# Patient Record
Sex: FEMALE | Race: ASIAN | NOT HISPANIC OR LATINO | Employment: OTHER
[De-identification: names, ages, dates, MRNs, and addresses within clinical notes are randomized per-mention and may not be internally consistent; named-entity substitution may affect disease eponyms.]

---

## 2017-09-17 ENCOUNTER — HOSPITAL (OUTPATIENT)
Dept: OTHER | Age: 82
End: 2017-09-17
Attending: FAMILY MEDICINE

## 2017-09-17 LAB
AMORPH SED URNS QL MICRO: ABNORMAL
ANALYZER ANC (IANC): NORMAL
ANION GAP SERPL CALC-SCNC: 14 MMOL/L (ref 10–20)
APPEARANCE UR: CLEAR
BACTERIA #/AREA URNS HPF: ABNORMAL /HPF
BASOPHILS # BLD: 0 THOUSAND/MCL (ref 0–0.3)
BASOPHILS NFR BLD: 0 %
BILIRUB UR QL: NEGATIVE
BUN SERPL-MCNC: 16 MG/DL (ref 6–20)
BUN/CREAT SERPL: 22 (ref 7–25)
CALCIUM SERPL-MCNC: 9.2 MG/DL (ref 8.4–10.2)
CAOX CRY URNS QL MICRO: ABNORMAL
CHLORIDE: 96 MMOL/L (ref 98–107)
CO2 SERPL-SCNC: 29 MMOL/L (ref 21–32)
COLOR UR: YELLOW
CREAT SERPL-MCNC: 0.72 MG/DL (ref 0.51–0.95)
DIFFERENTIAL METHOD BLD: NORMAL
EOSINOPHIL # BLD: 0.3 THOUSAND/MCL (ref 0.1–0.5)
EOSINOPHIL NFR BLD: 4 %
EPITH CASTS #/AREA URNS LPF: ABNORMAL /[LPF]
ERYTHROCYTE [DISTWIDTH] IN BLOOD: 13.7 % (ref 11–15)
FATTY CASTS #/AREA URNS LPF: ABNORMAL /[LPF]
GLUCOSE BLDC GLUCOMTR-MCNC: 134 MG/DL (ref 65–99)
GLUCOSE BLDC GLUCOMTR-MCNC: 208 MG/DL (ref 65–99)
GLUCOSE SERPL-MCNC: 135 MG/DL (ref 65–99)
GLUCOSE UR-MCNC: NEGATIVE MG/DL
GLYCOHEMOGLOBIN: 6.6 % (ref 4.5–5.6)
GRAN CASTS #/AREA URNS LPF: ABNORMAL /[LPF]
HEMATOCRIT: 38.3 % (ref 36–46.5)
HGB BLD-MCNC: 12.5 GM/DL (ref 12–15.5)
HGB UR QL: NEGATIVE
HYALINE CASTS #/AREA URNS LPF: ABNORMAL /LPF (ref 0–5)
KETONES UR-MCNC: NEGATIVE MG/DL
LEUKOCYTE ESTERASE UR QL STRIP: ABNORMAL
LYMPHOCYTES # BLD: 2.2 THOUSAND/MCL (ref 1–4)
LYMPHOCYTES NFR BLD: 29 %
MCH RBC QN AUTO: 29.6 PG (ref 26–34)
MCHC RBC AUTO-ENTMCNC: 32.6 GM/DL (ref 32–36.5)
MCV RBC AUTO: 90.5 FL (ref 78–100)
MIXED CELL CASTS #/AREA URNS LPF: ABNORMAL /[LPF]
MONOCYTES # BLD: 0.5 THOUSAND/MCL (ref 0.3–0.9)
MONOCYTES NFR BLD: 7 %
MUCOUS THREADS URNS QL MICRO: ABNORMAL
NEUTROPHILS # BLD: 4.5 THOUSAND/MCL (ref 1.8–7.7)
NEUTROPHILS NFR BLD: 60 %
NEUTS SEG NFR BLD: NORMAL %
NITRITE UR QL: NEGATIVE
PERCENT NRBC: NORMAL
PH UR: 7 UNIT (ref 5–7)
PLATELET # BLD: 148 THOUSAND/MCL (ref 140–450)
POTASSIUM SERPL-SCNC: 4.1 MMOL/L (ref 3.4–5.1)
PROT UR QL: NEGATIVE MG/DL
RBC # BLD: 4.23 MILLION/MCL (ref 4–5.2)
RBC #/AREA URNS HPF: ABNORMAL /HPF (ref 0–3)
RBC CASTS #/AREA URNS LPF: ABNORMAL /[LPF]
RENAL EPI CELLS #/AREA URNS HPF: ABNORMAL /[HPF]
SODIUM SERPL-SCNC: 135 MMOL/L (ref 135–145)
SP GR UR: 1 (ref 1–1.03)
SPECIMEN SOURCE: ABNORMAL
SPERM URNS QL MICRO: ABNORMAL
SQUAMOUS #/AREA URNS HPF: ABNORMAL /HPF (ref 0–5)
T VAGINALIS URNS QL MICRO: ABNORMAL
TRI-PHOS CRY URNS QL MICRO: ABNORMAL
TROPONIN I SERPL HS-MCNC: <0.02 NG/ML
URATE CRY URNS QL MICRO: ABNORMAL
URINE REFLEX: ABNORMAL
URNS CMNT MICRO: ABNORMAL
UROBILINOGEN UR QL: 0.2 MG/DL (ref 0–1)
WAXY CASTS #/AREA URNS LPF: ABNORMAL /[LPF]
WBC # BLD: 7.4 THOUSAND/MCL (ref 4.2–11)
WBC #/AREA URNS HPF: ABNORMAL /HPF (ref 0–5)
WBC CASTS #/AREA URNS LPF: ABNORMAL /[LPF]
YEAST HYPHAE URNS QL MICRO: ABNORMAL
YEAST URNS QL MICRO: ABNORMAL

## 2017-09-18 ENCOUNTER — DIAGNOSTIC TRANS (OUTPATIENT)
Dept: OTHER | Age: 82
End: 2017-09-18

## 2017-09-18 ENCOUNTER — CHARTING TRANS (OUTPATIENT)
Dept: OTHER | Age: 82
End: 2017-09-18

## 2017-09-18 LAB
GLUCOSE BLDC GLUCOMTR-MCNC: 110 MG/DL (ref 65–99)
GLUCOSE BLDC GLUCOMTR-MCNC: 113 MG/DL (ref 65–99)
GLUCOSE BLDC GLUCOMTR-MCNC: 138 MG/DL (ref 65–99)

## 2017-09-19 ENCOUNTER — DIAGNOSTIC TRANS (OUTPATIENT)
Dept: OTHER | Age: 82
End: 2017-09-19

## 2017-09-19 LAB
CHOLEST SERPL-MCNC: 131 MG/DL
CHOLEST/HDLC SERPL: 2.6 {RATIO}
GLUCOSE BLDC GLUCOMTR-MCNC: 105 MG/DL (ref 65–99)
GLUCOSE BLDC GLUCOMTR-MCNC: 116 MG/DL (ref 65–99)
GLUCOSE BLDC GLUCOMTR-MCNC: 159 MG/DL (ref 65–99)
GLUCOSE BLDC GLUCOMTR-MCNC: 179 MG/DL (ref 65–99)
HDLC SERPL-MCNC: 51 MG/DL
LDLC SERPL CALC-MCNC: 53 MG/DL
MAGNESIUM SERPL-MCNC: 1.3 MG/DL (ref 1.7–2.4)
NONHDLC SERPL-MCNC: 80 MG/DL
T4 SERPL-MCNC: 9.8 MCG/DL (ref 4.7–13.3)
TRIGLYCERIDE (TRIGP): 137 MG/DL
TSH SERPL-ACNC: 3.1 MCUNIT/ML (ref 0.35–5)

## 2017-10-23 ENCOUNTER — CHARTING TRANS (OUTPATIENT)
Dept: OTHER | Age: 82
End: 2017-10-23

## 2020-10-08 ENCOUNTER — HOSPITAL ENCOUNTER (EMERGENCY)
Age: 85
Discharge: HOME | End: 2020-10-08
Attending: EMERGENCY MEDICINE

## 2020-10-08 ENCOUNTER — APPOINTMENT (OUTPATIENT)
Dept: GENERAL RADIOLOGY | Age: 85
End: 2020-10-08
Attending: STUDENT IN AN ORGANIZED HEALTH CARE EDUCATION/TRAINING PROGRAM

## 2020-10-08 VITALS
DIASTOLIC BLOOD PRESSURE: 75 MMHG | OXYGEN SATURATION: 97 % | TEMPERATURE: 97.5 F | WEIGHT: 124.56 LBS | RESPIRATION RATE: 16 BRPM | SYSTOLIC BLOOD PRESSURE: 143 MMHG | HEART RATE: 70 BPM

## 2020-10-08 DIAGNOSIS — R55 SYNCOPE AND COLLAPSE: Primary | ICD-10-CM

## 2020-10-08 DIAGNOSIS — N30.00 ACUTE CYSTITIS WITHOUT HEMATURIA: ICD-10-CM

## 2020-10-08 LAB
ALBUMIN SERPL-MCNC: 3.4 G/DL (ref 3.6–5.1)
ALBUMIN/GLOB SERPL: 0.9 {RATIO} (ref 1–2.4)
ALP SERPL-CCNC: 51 UNITS/L (ref 45–117)
ALT SERPL-CCNC: 21 UNITS/L
ANION GAP SERPL CALC-SCNC: 6 MMOL/L (ref 10–20)
APPEARANCE UR: ABNORMAL
AST SERPL-CCNC: 18 UNITS/L
ATRIAL RATE (BPM): 62
BACTERIA #/AREA URNS HPF: ABNORMAL /HPF
BASOPHILS # BLD: 0 K/MCL (ref 0–0.3)
BASOPHILS NFR BLD: 0 %
BILIRUB SERPL-MCNC: 0.3 MG/DL (ref 0.2–1)
BILIRUB UR QL STRIP: NEGATIVE
BUN SERPL-MCNC: 21 MG/DL (ref 6–20)
BUN/CREAT SERPL: 34 (ref 7–25)
CALCIUM SERPL-MCNC: 9.1 MG/DL (ref 8.4–10.2)
CHLORIDE SERPL-SCNC: 92 MMOL/L (ref 98–107)
CO2 SERPL-SCNC: 33 MMOL/L (ref 21–32)
COLOR UR: YELLOW
CREAT SERPL-MCNC: 0.61 MG/DL (ref 0.51–0.95)
DIFFERENTIAL METHOD BLD: ABNORMAL
EOSINOPHIL # BLD: 0.2 K/MCL (ref 0.1–0.5)
EOSINOPHIL NFR BLD: 5 %
ERYTHROCYTE [DISTWIDTH] IN BLOOD: 16.2 % (ref 11–15)
GLOBULIN SER-MCNC: 3.9 G/DL (ref 2–4)
GLUCOSE SERPL-MCNC: 127 MG/DL (ref 65–99)
GLUCOSE UR STRIP-MCNC: NEGATIVE MG/DL
HCT VFR BLD CALC: 34.3 % (ref 36–46.5)
HGB BLD-MCNC: 11.2 G/DL (ref 12–15.5)
HGB UR QL STRIP: NEGATIVE
HYALINE CASTS #/AREA URNS LPF: ABNORMAL /LPF (ref 0–5)
IMM GRANULOCYTES # BLD AUTO: 0 K/MCL (ref 0–0.2)
IMM GRANULOCYTES NFR BLD: 1 %
KETONES UR STRIP-MCNC: NEGATIVE MG/DL
LEUKOCYTE ESTERASE UR QL STRIP: ABNORMAL
LYMPHOCYTES # BLD: 1.4 K/MCL (ref 1–4)
LYMPHOCYTES NFR BLD: 29 %
MAGNESIUM SERPL-MCNC: 1.7 MG/DL (ref 1.7–2.4)
MCH RBC QN AUTO: 30 PG (ref 26–34)
MCHC RBC AUTO-ENTMCNC: 32.7 G/DL (ref 32–36.5)
MCV RBC AUTO: 92 FL (ref 78–100)
MONOCYTES # BLD: 0.5 K/MCL (ref 0.3–0.9)
MONOCYTES NFR BLD: 10 %
MUCOUS THREADS URNS QL MICRO: PRESENT
NEUTROPHILS # BLD: 2.8 K/MCL (ref 1.8–7.7)
NEUTROPHILS NFR BLD: 55 %
NITRITE UR QL STRIP: NEGATIVE
NRBC BLD MANUAL-RTO: 0 /100 WBC
P AXIS (DEGREES): 37
PH UR STRIP: 7 UNITS (ref 5–7)
PLATELET # BLD: 207 K/MCL (ref 140–450)
POTASSIUM SERPL-SCNC: 4.3 MMOL/L (ref 3.4–5.1)
PR-INTERVAL (MSEC): 140
PROT SERPL-MCNC: 7.3 G/DL (ref 6.4–8.2)
PROT UR STRIP-MCNC: 30 MG/DL
QRS-INTERVAL (MSEC): 104
QT-INTERVAL (MSEC): 416
QTC: 422
R AXIS (DEGREES): 5
RBC # BLD: 3.73 MIL/MCL (ref 4–5.2)
RBC #/AREA URNS HPF: ABNORMAL /HPF (ref 0–2)
REPORT TEXT: NORMAL
SODIUM SERPL-SCNC: 127 MMOL/L (ref 135–145)
SP GR UR STRIP: 1.01 (ref 1–1.03)
SPECIMEN SOURCE: ABNORMAL
SQUAMOUS #/AREA URNS HPF: ABNORMAL /HPF (ref 0–5)
T AXIS (DEGREES): 56
TROPONIN I SERPL HS-MCNC: <0.02 NG/ML
TROPONIN I SERPL HS-MCNC: <0.02 NG/ML
UROBILINOGEN UR STRIP-MCNC: 0.2 MG/DL (ref 0–1)
VENTRICULAR RATE EKG/MIN (BPM): 62
WBC # BLD: 5 K/MCL (ref 4.2–11)
WBC #/AREA URNS HPF: >100 /HPF (ref 0–5)

## 2020-10-08 PROCEDURE — 84484 ASSAY OF TROPONIN QUANT: CPT

## 2020-10-08 PROCEDURE — 10002807 HB RX 258: Performed by: EMERGENCY MEDICINE

## 2020-10-08 PROCEDURE — 71045 X-RAY EXAM CHEST 1 VIEW: CPT

## 2020-10-08 PROCEDURE — 10002800 HB RX 250 W HCPCS: Performed by: EMERGENCY MEDICINE

## 2020-10-08 PROCEDURE — 87086 URINE CULTURE/COLONY COUNT: CPT

## 2020-10-08 PROCEDURE — 81001 URINALYSIS AUTO W/SCOPE: CPT

## 2020-10-08 PROCEDURE — 87186 SC STD MICRODIL/AGAR DIL: CPT

## 2020-10-08 PROCEDURE — 85025 COMPLETE CBC W/AUTO DIFF WBC: CPT

## 2020-10-08 PROCEDURE — 96374 THER/PROPH/DIAG INJ IV PUSH: CPT

## 2020-10-08 PROCEDURE — 10002803 HB RX 637: Performed by: EMERGENCY MEDICINE

## 2020-10-08 PROCEDURE — 10004651 HB RX, NO CHARGE ITEM: Performed by: EMERGENCY MEDICINE

## 2020-10-08 PROCEDURE — 99285 EMERGENCY DEPT VISIT HI MDM: CPT

## 2020-10-08 PROCEDURE — 80053 COMPREHEN METABOLIC PANEL: CPT

## 2020-10-08 PROCEDURE — 83735 ASSAY OF MAGNESIUM: CPT

## 2020-10-08 PROCEDURE — 93005 ELECTROCARDIOGRAM TRACING: CPT | Performed by: STUDENT IN AN ORGANIZED HEALTH CARE EDUCATION/TRAINING PROGRAM

## 2020-10-08 PROCEDURE — 87077 CULTURE AEROBIC IDENTIFY: CPT

## 2020-10-08 RX ORDER — LISINOPRIL 10 MG/1
10 TABLET ORAL
COMMUNITY
End: 2021-09-01 | Stop reason: ALTCHOICE

## 2020-10-08 RX ORDER — LOSARTAN POTASSIUM 50 MG/1
50 TABLET ORAL 2 TIMES DAILY
COMMUNITY
End: 2021-12-01 | Stop reason: ALTCHOICE

## 2020-10-08 RX ORDER — LANOLIN ALCOHOL/MO/W.PET/CERES
400 CREAM (GRAM) TOPICAL ONCE
Status: COMPLETED | OUTPATIENT
Start: 2020-10-08 | End: 2020-10-08

## 2020-10-08 RX ORDER — ACETAMINOPHEN 325 MG/1
650 TABLET ORAL ONCE
Status: COMPLETED | OUTPATIENT
Start: 2020-10-08 | End: 2020-10-08

## 2020-10-08 RX ORDER — VERAPAMIL HYDROCHLORIDE 240 MG/1
TABLET, FILM COATED, EXTENDED RELEASE ORAL
COMMUNITY
End: 2021-09-01 | Stop reason: ALTCHOICE

## 2020-10-08 RX ORDER — FOSINOPRIL SODIUM 40 MG/1
TABLET ORAL
COMMUNITY
End: 2021-09-01 | Stop reason: ALTCHOICE

## 2020-10-08 RX ORDER — CEFAZOLIN SODIUM/WATER 2 G/20 ML
2000 SYRINGE (ML) INTRAVENOUS ONCE
Status: COMPLETED | OUTPATIENT
Start: 2020-10-08 | End: 2020-10-08

## 2020-10-08 RX ORDER — ACETAMINOPHEN 325 MG/1
TABLET ORAL
Status: DISCONTINUED
Start: 2020-10-08 | End: 2020-10-08 | Stop reason: HOSPADM

## 2020-10-08 RX ORDER — CARVEDILOL 25 MG/1
25 TABLET ORAL 2 TIMES DAILY WITH MEALS
Status: ON HOLD | COMMUNITY
End: 2021-12-07 | Stop reason: HOSPADM

## 2020-10-08 RX ORDER — ALENDRONATE SODIUM 70 MG/1
TABLET ORAL
COMMUNITY
End: 2021-09-01 | Stop reason: ALTCHOICE

## 2020-10-08 RX ORDER — OXYBUTYNIN CHLORIDE 5 MG/1
TABLET ORAL
COMMUNITY
Start: 2020-07-23 | End: 2021-09-01 | Stop reason: ALTCHOICE

## 2020-10-08 RX ORDER — SIMVASTATIN 20 MG
TABLET ORAL
COMMUNITY
Start: 2020-07-22 | End: 2021-09-01 | Stop reason: ALTCHOICE

## 2020-10-08 RX ORDER — ISOSORBIDE MONONITRATE 30 MG/1
60 TABLET, EXTENDED RELEASE ORAL DAILY
COMMUNITY
End: 2021-09-30 | Stop reason: DRUGHIGH

## 2020-10-08 RX ORDER — CEPHALEXIN 500 MG/1
500 CAPSULE ORAL 2 TIMES DAILY
Qty: 14 CAPSULE | Refills: 0 | Status: SHIPPED | OUTPATIENT
Start: 2020-10-08 | End: 2020-10-15

## 2020-10-08 RX ORDER — FUROSEMIDE 20 MG/1
20 TABLET ORAL DAILY
COMMUNITY
End: 2021-09-01 | Stop reason: ALTCHOICE

## 2020-10-08 RX ORDER — HYDROCHLOROTHIAZIDE 25 MG/1
TABLET ORAL
COMMUNITY
End: 2021-09-01 | Stop reason: ALTCHOICE

## 2020-10-08 RX ORDER — GLIPIZIDE 10 MG/1
TABLET, FILM COATED, EXTENDED RELEASE ORAL
COMMUNITY
End: 2021-09-01 | Stop reason: ALTCHOICE

## 2020-10-08 RX ADMIN — Medication 400 MG: at 16:40

## 2020-10-08 RX ADMIN — CEFTRIAXONE SODIUM 2000 MG: 100 INJECTION, POWDER, FOR SOLUTION INTRAVENOUS at 18:50

## 2020-10-08 RX ADMIN — SODIUM CHLORIDE 500 ML: 0.9 INJECTION, SOLUTION INTRAVENOUS at 16:41

## 2020-10-08 RX ADMIN — ACETAMINOPHEN 650 MG: 325 TABLET, FILM COATED ORAL at 18:58

## 2020-10-08 ASSESSMENT — ENCOUNTER SYMPTOMS
HEMATOLOGIC/LYMPHATIC NEGATIVE: 1
FATIGUE: 1
GASTROINTESTINAL NEGATIVE: 1
NEUROLOGICAL NEGATIVE: 1
RESPIRATORY NEGATIVE: 1
ENDOCRINE NEGATIVE: 1
PSYCHIATRIC NEGATIVE: 1

## 2020-10-10 LAB
BACTERIA UR CULT: ABNORMAL
ORGANISM: ABNORMAL
REPORT STATUS (RPT): ABNORMAL
SPECIMEN SOURCE: ABNORMAL

## 2021-07-25 ENCOUNTER — APPOINTMENT (OUTPATIENT)
Dept: CT IMAGING | Facility: HOSPITAL | Age: 86
DRG: 445 | End: 2021-07-25
Attending: EMERGENCY MEDICINE
Payer: MEDICARE

## 2021-07-25 ENCOUNTER — APPOINTMENT (OUTPATIENT)
Dept: GENERAL RADIOLOGY | Facility: HOSPITAL | Age: 86
DRG: 445 | End: 2021-07-25
Payer: MEDICARE

## 2021-07-25 ENCOUNTER — HOSPITAL ENCOUNTER (INPATIENT)
Facility: HOSPITAL | Age: 86
LOS: 9 days | Discharge: NURSING, REHAB OR LONG-TERM CARE | DRG: 445 | End: 2021-08-04
Attending: EMERGENCY MEDICINE | Admitting: STUDENT IN AN ORGANIZED HEALTH CARE EDUCATION/TRAINING PROGRAM
Payer: MEDICARE

## 2021-07-25 DIAGNOSIS — E87.1 HYPONATREMIA: ICD-10-CM

## 2021-07-25 DIAGNOSIS — D64.9 ANEMIA, UNSPECIFIED TYPE: ICD-10-CM

## 2021-07-25 DIAGNOSIS — R17 PAINLESS JAUNDICE: Primary | ICD-10-CM

## 2021-07-25 DIAGNOSIS — R55 SYNCOPE, NEAR: ICD-10-CM

## 2021-07-25 DIAGNOSIS — R79.89 ELEVATED LFTS: ICD-10-CM

## 2021-07-25 DIAGNOSIS — R79.89 AZOTEMIA: ICD-10-CM

## 2021-07-25 DIAGNOSIS — R11.2 NAUSEA AND VOMITING IN ADULT: ICD-10-CM

## 2021-07-25 LAB
ALBUMIN SERPL-MCNC: 2.4 G/DL (ref 3.4–5)
ALBUMIN/GLOB SERPL: 0.5 {RATIO} (ref 1–2)
ALP LIVER SERPL-CCNC: 1450 U/L
ALT SERPL-CCNC: 145 U/L
ANION GAP SERPL CALC-SCNC: 6 MMOL/L (ref 0–18)
AST SERPL-CCNC: 287 U/L (ref 15–37)
BASOPHILS # BLD AUTO: 0.02 X10(3) UL (ref 0–0.2)
BASOPHILS NFR BLD AUTO: 0.3 %
BILIRUB SERPL-MCNC: 8.9 MG/DL (ref 0.1–2)
BILIRUB UR QL STRIP.AUTO: NEGATIVE
BUN BLD-MCNC: 12 MG/DL (ref 7–18)
BUN/CREAT SERPL: 26.7 (ref 10–20)
CALCIUM BLD-MCNC: 8.7 MG/DL (ref 8.5–10.1)
CHLORIDE SERPL-SCNC: 85 MMOL/L (ref 98–112)
CLARITY UR REFRACT.AUTO: CLEAR
CO2 SERPL-SCNC: 31 MMOL/L (ref 21–32)
COLOR UR AUTO: YELLOW
CREAT BLD-MCNC: 0.45 MG/DL
DEPRECATED RDW RBC AUTO: 55.3 FL (ref 35.1–46.3)
EOSINOPHIL # BLD AUTO: 0.22 X10(3) UL (ref 0–0.7)
EOSINOPHIL NFR BLD AUTO: 3.2 %
ERYTHROCYTE [DISTWIDTH] IN BLOOD BY AUTOMATED COUNT: 18.1 % (ref 11–15)
GLOBULIN PLAS-MCNC: 4.9 G/DL (ref 2.8–4.4)
GLUCOSE BLD-MCNC: 159 MG/DL (ref 70–99)
GLUCOSE UR STRIP.AUTO-MCNC: NEGATIVE MG/DL
HCT VFR BLD AUTO: 32.3 %
HGB BLD-MCNC: 11.1 G/DL
IMM GRANULOCYTES # BLD AUTO: 0.05 X10(3) UL (ref 0–1)
IMM GRANULOCYTES NFR BLD: 0.7 %
KETONES UR STRIP.AUTO-MCNC: NEGATIVE MG/DL
LIPASE SERPL-CCNC: 223 U/L (ref 73–393)
LYMPHOCYTES # BLD AUTO: 1.08 X10(3) UL (ref 1–4)
LYMPHOCYTES NFR BLD AUTO: 15.5 %
M PROTEIN MFR SERPL ELPH: 7.3 G/DL (ref 6.4–8.2)
MCH RBC QN AUTO: 29.6 PG (ref 26–34)
MCHC RBC AUTO-ENTMCNC: 34.4 G/DL (ref 31–37)
MCV RBC AUTO: 86.1 FL
MONOCYTES # BLD AUTO: 0.44 X10(3) UL (ref 0.1–1)
MONOCYTES NFR BLD AUTO: 6.3 %
NEUTROPHILS # BLD AUTO: 5.14 X10 (3) UL (ref 1.5–7.7)
NEUTROPHILS # BLD AUTO: 5.14 X10(3) UL (ref 1.5–7.7)
NEUTROPHILS NFR BLD AUTO: 74 %
NITRITE UR QL STRIP.AUTO: NEGATIVE
OSMOLALITY SERPL CALC.SUM OF ELEC: 257 MOSM/KG (ref 275–295)
PH UR STRIP.AUTO: 8 [PH] (ref 5–8)
PLATELET # BLD AUTO: 251 10(3)UL (ref 150–450)
PLATELET MORPHOLOGY: NORMAL
POTASSIUM SERPL-SCNC: 3.9 MMOL/L (ref 3.5–5.1)
PROT UR STRIP.AUTO-MCNC: NEGATIVE MG/DL
RBC # BLD AUTO: 3.75 X10(6)UL
RBC UR QL AUTO: NEGATIVE
SARS-COV-2 RNA RESP QL NAA+PROBE: NOT DETECTED
SODIUM SERPL-SCNC: 122 MMOL/L (ref 136–145)
SP GR UR STRIP.AUTO: 1.01 (ref 1–1.03)
TROPONIN I SERPL-MCNC: <0.045 NG/ML (ref ?–0.04)
UROBILINOGEN UR STRIP.AUTO-MCNC: <2 MG/DL
WBC # BLD AUTO: 7 X10(3) UL (ref 4–11)

## 2021-07-25 PROCEDURE — 99223 1ST HOSP IP/OBS HIGH 75: CPT | Performed by: STUDENT IN AN ORGANIZED HEALTH CARE EDUCATION/TRAINING PROGRAM

## 2021-07-25 PROCEDURE — 74177 CT ABD & PELVIS W/CONTRAST: CPT | Performed by: EMERGENCY MEDICINE

## 2021-07-25 PROCEDURE — 71045 X-RAY EXAM CHEST 1 VIEW: CPT

## 2021-07-25 PROCEDURE — 71045 X-RAY EXAM CHEST 1 VIEW: CPT | Performed by: EMERGENCY MEDICINE

## 2021-07-25 RX ORDER — LOSARTAN POTASSIUM 50 MG/1
50 TABLET ORAL 2 TIMES DAILY
COMMUNITY

## 2021-07-25 RX ORDER — ONDANSETRON 2 MG/ML
4 INJECTION INTRAMUSCULAR; INTRAVENOUS ONCE
Status: COMPLETED | OUTPATIENT
Start: 2021-07-25 | End: 2021-07-25

## 2021-07-25 RX ORDER — SODIUM CHLORIDE 9 MG/ML
INJECTION, SOLUTION INTRAVENOUS CONTINUOUS
Status: DISCONTINUED | OUTPATIENT
Start: 2021-07-25 | End: 2021-07-29

## 2021-07-26 ENCOUNTER — APPOINTMENT (OUTPATIENT)
Dept: MRI IMAGING | Facility: HOSPITAL | Age: 86
DRG: 445 | End: 2021-07-26
Attending: NURSE PRACTITIONER
Payer: MEDICARE

## 2021-07-26 ENCOUNTER — APPOINTMENT (OUTPATIENT)
Dept: ULTRASOUND IMAGING | Facility: HOSPITAL | Age: 86
DRG: 445 | End: 2021-07-26
Attending: STUDENT IN AN ORGANIZED HEALTH CARE EDUCATION/TRAINING PROGRAM
Payer: MEDICARE

## 2021-07-26 PROBLEM — D64.9 ANEMIA, UNSPECIFIED TYPE: Status: ACTIVE | Noted: 2021-07-26

## 2021-07-26 PROBLEM — R79.89 AZOTEMIA: Status: ACTIVE | Noted: 2021-07-26

## 2021-07-26 PROBLEM — R55 SYNCOPE, NEAR: Status: ACTIVE | Noted: 2021-07-26

## 2021-07-26 PROBLEM — R11.2 NAUSEA AND VOMITING IN ADULT: Status: ACTIVE | Noted: 2021-07-26

## 2021-07-26 PROBLEM — E87.1 HYPONATREMIA: Status: ACTIVE | Noted: 2021-07-26

## 2021-07-26 LAB
ALBUMIN SERPL-MCNC: 2.1 G/DL (ref 3.4–5)
ALBUMIN/GLOB SERPL: 0.5 {RATIO} (ref 1–2)
ALP LIVER SERPL-CCNC: 1256 U/L
ALT SERPL-CCNC: 110 U/L
ANION GAP SERPL CALC-SCNC: 5 MMOL/L (ref 0–18)
APAP SERPL-MCNC: <2 UG/ML (ref 10–30)
AST SERPL-CCNC: 198 U/L (ref 15–37)
ATRIAL RATE: 78 BPM
ATRIAL RATE: 80 BPM
BASOPHILS # BLD AUTO: 0.02 X10(3) UL (ref 0–0.2)
BASOPHILS NFR BLD AUTO: 0.3 %
BILIRUB DIRECT SERPL-MCNC: 5 MG/DL (ref 0–0.2)
BILIRUB SERPL-MCNC: 5.9 MG/DL (ref 0.1–2)
BUN BLD-MCNC: 13 MG/DL (ref 7–18)
BUN/CREAT SERPL: 32.5 (ref 10–20)
CALCIUM BLD-MCNC: 8.3 MG/DL (ref 8.5–10.1)
CHLORIDE SERPL-SCNC: 91 MMOL/L (ref 98–112)
CO2 SERPL-SCNC: 29 MMOL/L (ref 21–32)
CREAT BLD-MCNC: 0.4 MG/DL
DEPRECATED HBV CORE AB SER IA-ACNC: 294.1 NG/ML
DEPRECATED RDW RBC AUTO: 58.4 FL (ref 35.1–46.3)
EOSINOPHIL # BLD AUTO: 0.03 X10(3) UL (ref 0–0.7)
EOSINOPHIL NFR BLD AUTO: 0.5 %
ERYTHROCYTE [DISTWIDTH] IN BLOOD BY AUTOMATED COUNT: 18.3 % (ref 11–15)
EST. AVERAGE GLUCOSE BLD GHB EST-MCNC: 97 MG/DL (ref 68–126)
GLOBULIN PLAS-MCNC: 4 G/DL (ref 2.8–4.4)
GLUCOSE BLD-MCNC: 105 MG/DL (ref 70–99)
GLUCOSE BLD-MCNC: 108 MG/DL (ref 70–99)
GLUCOSE BLD-MCNC: 111 MG/DL (ref 70–99)
GLUCOSE BLD-MCNC: 122 MG/DL (ref 70–99)
GLUCOSE BLD-MCNC: 136 MG/DL (ref 70–99)
HAV AB SER QL IA: REACTIVE
HAV IGM SER QL: NONREACTIVE
HBA1C MFR BLD HPLC: 5 % (ref ?–5.7)
HBV SURFACE AB SER QL: NONREACTIVE
HBV SURFACE AB SERPL IA-ACNC: <3.1 MIU/ML
HBV SURFACE AG SER-ACNC: <0.1 [IU]/L
HBV SURFACE AG SERPL QL IA: NONREACTIVE
HCT VFR BLD AUTO: 31.5 %
HCV AB SERPL QL IA: NONREACTIVE
HGB BLD-MCNC: 10.7 G/DL
IMM GRANULOCYTES # BLD AUTO: 0.05 X10(3) UL (ref 0–1)
IMM GRANULOCYTES NFR BLD: 0.8 %
IRON SATURATION: 31 %
IRON SERPL-MCNC: 72 UG/DL
LYMPHOCYTES # BLD AUTO: 0.86 X10(3) UL (ref 1–4)
LYMPHOCYTES NFR BLD AUTO: 13.8 %
M PROTEIN MFR SERPL ELPH: 6.1 G/DL (ref 6.4–8.2)
MCH RBC QN AUTO: 30 PG (ref 26–34)
MCHC RBC AUTO-ENTMCNC: 34 G/DL (ref 31–37)
MCV RBC AUTO: 88.2 FL
MONOCYTES # BLD AUTO: 0.47 X10(3) UL (ref 0.1–1)
MONOCYTES NFR BLD AUTO: 7.5 %
NEUTROPHILS # BLD AUTO: 4.82 X10 (3) UL (ref 1.5–7.7)
NEUTROPHILS # BLD AUTO: 4.82 X10(3) UL (ref 1.5–7.7)
NEUTROPHILS NFR BLD AUTO: 77.1 %
NT-PROBNP SERPL-MCNC: 1745 PG/ML (ref ?–450)
OSMOLALITY SERPL CALC.SUM OF ELEC: 261 MOSM/KG (ref 275–295)
P AXIS: 20 DEGREES
P AXIS: 36 DEGREES
P-R INTERVAL: 146 MS
P-R INTERVAL: 160 MS
PLATELET # BLD AUTO: 235 10(3)UL (ref 150–450)
POTASSIUM SERPL-SCNC: 3.8 MMOL/L (ref 3.5–5.1)
Q-T INTERVAL: 422 MS
Q-T INTERVAL: 426 MS
QRS DURATION: 110 MS
QRS DURATION: 94 MS
QTC CALCULATION (BEZET): 485 MS
QTC CALCULATION (BEZET): 486 MS
R AXIS: -11 DEGREES
R AXIS: 91 DEGREES
RBC # BLD AUTO: 3.57 X10(6)UL
SODIUM SERPL-SCNC: 125 MMOL/L (ref 136–145)
T AXIS: 28 DEGREES
T AXIS: 53 DEGREES
TOTAL IRON BINDING CAPACITY: 232 UG/DL (ref 240–450)
TRANSFERRIN SERPL-MCNC: 156 MG/DL (ref 200–360)
TRANSFERRIN SERPL-MCNC: 156 MG/DL (ref 200–360)
VENTRICULAR RATE: 78 BPM
VENTRICULAR RATE: 80 BPM
VIT B12 SERPL-MCNC: 674 PG/ML (ref 193–986)
WBC # BLD AUTO: 6.3 X10(3) UL (ref 4–11)

## 2021-07-26 PROCEDURE — 76700 US EXAM ABDOM COMPLETE: CPT | Performed by: STUDENT IN AN ORGANIZED HEALTH CARE EDUCATION/TRAINING PROGRAM

## 2021-07-26 PROCEDURE — 99223 1ST HOSP IP/OBS HIGH 75: CPT | Performed by: SURGERY

## 2021-07-26 PROCEDURE — 74181 MRI ABDOMEN W/O CONTRAST: CPT | Performed by: NURSE PRACTITIONER

## 2021-07-26 PROCEDURE — 76376 3D RENDER W/INTRP POSTPROCES: CPT | Performed by: NURSE PRACTITIONER

## 2021-07-26 PROCEDURE — 99232 SBSQ HOSP IP/OBS MODERATE 35: CPT | Performed by: HOSPITALIST

## 2021-07-26 RX ORDER — LOSARTAN POTASSIUM 50 MG/1
50 TABLET ORAL DAILY
Status: DISCONTINUED | OUTPATIENT
Start: 2021-07-26 | End: 2021-07-26

## 2021-07-26 RX ORDER — POLYETHYLENE GLYCOL 3350 17 G/17G
17 POWDER, FOR SOLUTION ORAL DAILY PRN
Status: DISCONTINUED | OUTPATIENT
Start: 2021-07-26 | End: 2021-08-04

## 2021-07-26 RX ORDER — ALPRAZOLAM 0.25 MG/1
0.25 TABLET ORAL 2 TIMES DAILY
Status: ON HOLD | COMMUNITY
Start: 2021-05-01 | End: 2021-08-04

## 2021-07-26 RX ORDER — SODIUM PHOSPHATE, DIBASIC AND SODIUM PHOSPHATE, MONOBASIC 7; 19 G/133ML; G/133ML
1 ENEMA RECTAL ONCE
Status: COMPLETED | OUTPATIENT
Start: 2021-07-26 | End: 2021-07-26

## 2021-07-26 RX ORDER — ENOXAPARIN SODIUM 100 MG/ML
40 INJECTION SUBCUTANEOUS DAILY
Status: DISCONTINUED | OUTPATIENT
Start: 2021-07-26 | End: 2021-08-04

## 2021-07-26 RX ORDER — ONDANSETRON 2 MG/ML
4 INJECTION INTRAMUSCULAR; INTRAVENOUS EVERY 6 HOURS PRN
Status: DISCONTINUED | OUTPATIENT
Start: 2021-07-26 | End: 2021-08-04

## 2021-07-26 RX ORDER — CARVEDILOL 12.5 MG/1
25 TABLET ORAL DAILY
Status: DISCONTINUED | OUTPATIENT
Start: 2021-07-26 | End: 2021-07-26

## 2021-07-26 RX ORDER — BISACODYL 10 MG
10 SUPPOSITORY, RECTAL RECTAL
Status: DISCONTINUED | OUTPATIENT
Start: 2021-07-26 | End: 2021-08-04

## 2021-07-26 RX ORDER — METOCLOPRAMIDE HYDROCHLORIDE 5 MG/ML
10 INJECTION INTRAMUSCULAR; INTRAVENOUS EVERY 8 HOURS PRN
Status: DISCONTINUED | OUTPATIENT
Start: 2021-07-26 | End: 2021-08-04

## 2021-07-26 RX ORDER — SODIUM CHLORIDE 9 MG/ML
INJECTION, SOLUTION INTRAVENOUS CONTINUOUS
Status: DISCONTINUED | OUTPATIENT
Start: 2021-07-26 | End: 2021-07-29

## 2021-07-26 RX ORDER — LISINOPRIL 40 MG/1
40 TABLET ORAL DAILY
Status: DISCONTINUED | OUTPATIENT
Start: 2021-07-26 | End: 2021-07-26

## 2021-07-26 RX ORDER — ISOSORBIDE MONONITRATE 60 MG/1
60 TABLET, EXTENDED RELEASE ORAL DAILY
COMMUNITY
Start: 2021-06-15

## 2021-07-26 RX ORDER — ONDANSETRON 2 MG/ML
4 INJECTION INTRAMUSCULAR; INTRAVENOUS EVERY 4 HOURS PRN
Status: DISCONTINUED | OUTPATIENT
Start: 2021-07-26 | End: 2021-07-26

## 2021-07-26 RX ORDER — POLYETHYLENE GLYCOL 3350 17 G/17G
17 POWDER, FOR SOLUTION ORAL EVERY OTHER DAY
Status: ON HOLD | COMMUNITY
Start: 2021-04-10 | End: 2021-07-31

## 2021-07-26 RX ORDER — TRAMADOL HYDROCHLORIDE 50 MG/1
50 TABLET ORAL EVERY 6 HOURS PRN
Status: DISCONTINUED | OUTPATIENT
Start: 2021-07-26 | End: 2021-08-04

## 2021-07-26 RX ORDER — LOSARTAN POTASSIUM 50 MG/1
50 TABLET ORAL 2 TIMES DAILY
Status: DISCONTINUED | OUTPATIENT
Start: 2021-07-26 | End: 2021-08-04

## 2021-07-26 RX ORDER — ISOSORBIDE MONONITRATE 60 MG/1
60 TABLET, EXTENDED RELEASE ORAL DAILY
Status: DISCONTINUED | OUTPATIENT
Start: 2021-07-27 | End: 2021-08-02

## 2021-07-26 RX ORDER — SODIUM PHOSPHATE, DIBASIC AND SODIUM PHOSPHATE, MONOBASIC 7; 19 G/133ML; G/133ML
1 ENEMA RECTAL ONCE AS NEEDED
Status: DISCONTINUED | OUTPATIENT
Start: 2021-07-26 | End: 2021-08-04

## 2021-07-26 RX ORDER — POLYETHYLENE GLYCOL 3350 17 G/17G
17 POWDER, FOR SOLUTION ORAL DAILY
Status: DISCONTINUED | OUTPATIENT
Start: 2021-07-26 | End: 2021-08-04

## 2021-07-26 RX ORDER — CARVEDILOL 12.5 MG/1
25 TABLET ORAL 2 TIMES DAILY WITH MEALS
Status: DISCONTINUED | OUTPATIENT
Start: 2021-07-26 | End: 2021-08-04

## 2021-07-26 RX ORDER — FUROSEMIDE 20 MG/1
20 TABLET ORAL DAILY
Status: ON HOLD | COMMUNITY
Start: 2021-06-27 | End: 2021-07-29

## 2021-07-26 RX ORDER — DEXTROSE MONOHYDRATE 25 G/50ML
50 INJECTION, SOLUTION INTRAVENOUS
Status: DISCONTINUED | OUTPATIENT
Start: 2021-07-26 | End: 2021-08-04

## 2021-07-26 RX ORDER — ATORVASTATIN CALCIUM 10 MG/1
10 TABLET, FILM COATED ORAL NIGHTLY
Status: DISCONTINUED | OUTPATIENT
Start: 2021-07-26 | End: 2021-08-04

## 2021-07-26 RX ORDER — NITROFURANTOIN MACROCRYSTALS 50 MG/1
50 CAPSULE ORAL NIGHTLY
Status: ON HOLD | COMMUNITY
Start: 2021-06-29 | End: 2021-08-04

## 2021-07-26 RX ORDER — SODIUM CHLORIDE 9 MG/ML
INJECTION, SOLUTION INTRAVENOUS CONTINUOUS
Status: ACTIVE | OUTPATIENT
Start: 2021-07-26 | End: 2021-07-26

## 2021-07-26 NOTE — OCCUPATIONAL THERAPY NOTE
OCCUPATIONAL THERAPY EVALUATION - INPATIENT     Room Number: 4422/7608-F  Evaluation Date: 7/26/2021  Type of Evaluation: Initial  Presenting Problem: jaundice    Physician Order: IP Consult to Occupational Therapy  Reason for Therapy: ADL/IADL Dysfunction COMPLEX  3/2/2011    Performed by Elease Angelucci at [facility]   • [address]  1/19/2011    [facility] [place] [address] SITUATION  Type of Home: House  Neo intact    Communication:speaks Laurita, able to communicate with her son    Behavioral/Emotional/Social: [facility]    RANGE OF MOTION AND STRENGTH ASSESSMENT  Upper extremity ROM is within functional limits     Upper extremity strength is within functional limits take the patient home with increased CG support. Nurse was updated on recommendation and patient mobiltiy status. Patient End of Session: Up in chair;Needs met;Call light within reach;RN aware of session/findings; All patient questions and concerns addre Recommendations: Mayela funes    PLAN  OT Treatment Plan: Energy conservation/work simplification techniques;ADL training;Functional transfer training; Endurance training;Patient/Family education;Patient/Family training;Equipment eval/education; Compensatory te

## 2021-07-26 NOTE — PLAN OF CARE
Patient alert and oriented x4. On Ra, . NSR on tele. Patient complains of bilateral knee pain. Heat packs applied. PRN tramadol. Purewick. Pt back in bed. Resting comfortably in bed. Will continue to monitor.       1300:  Plan for MRCP, MRI scre

## 2021-07-26 NOTE — PROGRESS NOTES
07/26/21 1231   Clinical Encounter Type   Visited With Patient and family together   Routine Visit Introduction   Continue Visiting Yes   Nondenominational Encounters   Nondenominational Needs Prayer    visited with patient and son.   provided pastoral c

## 2021-07-26 NOTE — ED PROVIDER NOTES
Patient Seen in: [facility] Emergency Department      History   Patient presents with:  Syncope  Nausea/Vomiting/Diarrhea  Abnormal Labs  Jaundice    Stated Complaint: syncope after n/v    HPI/Subjective:   HPI    59-year-old female [facility] [address] 2103]   BP (!) 189/75   Pulse 88   Resp 19   Temp 99.5 °F (37.5 °C)   Temp src Oral   SpO2 98 %   O2 Device None (Room air)       Current:BP (!) 189/75   Pulse 78   Temp 99.5 °F (37.5 °C) (Oral)   Resp 16   Ht 152.4 cm (5')   Wt 52.2 kg   SpO2 98%   BMI 22 ---------                               -----------         ------                     CBC W/ DIFFERENTIAL[[id number]]          Abnormal            Final result                 Please view results for these tests on the individual orders. Disposition and Plan     Clinical Impression:  Painless jaundice  (primary encounter diagnosis)  Hyponatremia  Anemia, unspecified type  Nausea and vomiting in adult  Azotemia  Syncope, near     Disposition:  There is no disposition on file for [facility]

## 2021-07-26 NOTE — DIETARY NOTE
[facility]    NUTRITION ASSESSMENT    Pt  malnutrition criteria.     CRITERIA FOR MALNUTRITION DIAGNOSIS:  Criteria for non-severe malnutrition diagnosis: chronic illness related to wt loss 10% in 6 months, energy intake less than75% for greater than 1 emesis    NUTRITION RELATED PHYSICAL FINDINGS:     1. Body Fat/Muscle Mass: mild depletion body fat triceps region and mild depletion muscle mass clavicle region      2.  Fluid Accumulation: none     NUTRITION PRESCRIPTION:   Calories: 8973-7825 calories/da

## 2021-07-26 NOTE — ED INITIAL ASSESSMENT (HPI)
Family called ems for weakness/syncope after eating tonight  Hx of syncope   pmd called son with abnormal lab values today.  Jaundice+  Ems gave [id number] iv pta

## 2021-07-26 NOTE — PHYSICAL THERAPY NOTE
PHYSICAL THERAPY EVALUATION - INPATIENT     Room Number: 5209/6515-E  Evaluation Date: 7/26/2021  Type of Evaluation: Initial  Physician Order: PT Eval and Treat    Presenting Problem: Painless jaundice, hyponatremia, near syncope and anemia  Reason History  Past Medical History:   Diagnosis Date   • Essential hypertension    • Type II or unspecified type diabetes mellitus without mention of complication, not stated as uncontrolled 2010       Past Surgical History  Past Surgical History:   Procedure L extremity ROM and strength are-see OT eval    Lower extremity ROM is within functional limits    Lower extremity strength is : LLE hip flex 3+/5, quad 3+/5,ankle 4-/5; RLE hip flex 4-/5, quad 4-/5, ankle 4-/5    BALANCE  Static Sitting: Fair -  Dynamic Sit able to amb 6 steps using RW c max A from EOB to adjacent BSC c the above deviations. Pt sat c mod A. BLE were elev, chair alarm activated and RN/PCT made aware of session.      Exercise/Education Provided:  Bed mobility  Body mechanics  Energy conservation Fair  Frequency (Obs): 3-5x/week  Number of Visits to Meet Established Goals: 5      CURRENT GOALS    Goal #1 Patient is able to demonstrate supine - sit EOB @ level: minimum assistance     Goal #2 Patient is able to demonstrate transfers Sit to/from Stand

## 2021-07-26 NOTE — PLAN OF CARE
Patient alert and oriented x4 on room air ,[address] ,family presented, admitted with syncope and elevated liver enzymes ,  Cxray and Ct abdomen done , US of Abdomen in am ,npo maintained ,will call Gi consult ,denies any cp/ chest discomfort , sinus on monitor, effectiveness of vasoactive medications to optimize hemodynamic stability  - Monitor arterial and/or venous puncture sites for bleeding and/or hematoma  - Assess quality of pulses, skin color and temperature  - Assess for signs of decreased coronary artery review patient's medication profile  Outcome: Progressing  Goal: Maintains adequate nutritional intake (undernourished)  Description: INTERVENTIONS:  - Monitor percentage of each meal consumed  - Identify factors contributing to decreased intake, treat as Administer electrolyte replacement as ordered  - Monitor response to electrolyte replacements, including rhythm and repeat lab results as appropriate  - Fluid restriction as ordered  - Instruct patient on fluid and nutrition restrictions as appropriate  Ou

## 2021-07-26 NOTE — H&P
[facility] HOSPITALIST  History and Physical     Medina Pinky Patient Status:  Emergency    1933 MRN [id number]   Location [facility] Attending Celia Stacy MD   Hosp Day # 0 PCP No primary care provider on file EC 81 MG OR TBEC, 1 TABLET DAILY, Disp: , Rfl:   SIMVASTATIN 20 MG OR TABS, 1 TABLET EVERY EVENING, Disp: , Rfl:   OYSTER-DINESH 500 OR, 1 TABLET TWICE DAILY, Disp: , Rfl:   GLIPIZIDE 10 MG OR TB24, 1 TABLET TWICE DAILY (Patient not taking:  ), Disp: , Rfl: Recent Labs   Lab 07/25/21 2109   *   BUN 12   CREATSERUM 0.45*   GFRAA 104   GFRNAA 90   CA 8.7   ALB 2.4*   *   K 3.9   CL 85*   CO2 31.0   ALKPHO 1,450*   *   *   BILT 8.9*   TP 7.3       Estimated Creatinine Clearance:

## 2021-07-26 NOTE — CM/SW NOTE
MSW met with pt's son, we discuss Donamae Mike vs MAXIMINO. Pt's son declines MAXIMINO. Son is calling senior services to see if he can get more than 11 hours a week to care for his mom. He works from home 4 days a week.  I discussed that getting 40 hours a week of care was n

## 2021-07-26 NOTE — PROGRESS NOTES
[facility] HOSPITALIST  Progress Note     Oleta Flattery Patient Status:  Inpatient    1933 MRN [id number]   [facility] 3NE-A Attending Angi Messina MD   Hosp Day # 0 PCP PHYSICIAN NONSTAFF     Chief Complaint: jaundice    S: Leann Posadas --  1,215*       Creatinine Kinase  No results for input(s): CK in the last 168 hours. Inflammatory Markers  No results for input(s): CRP, GENOVEVA, LDH, DDIMER in the last 168 hours. Imaging: Imaging data reviewed in Epic.     Medications:   • losartan P

## 2021-07-26 NOTE — CONSULTS
[facility]                       Gastroenterology [address] [facility] Gastroenterology    Oleta Flattery Patient Status:  Inpatient    1933 MRN [id number]   [facility] 3NE-A Attending Angi Messina MD   Hosp Day # 0 [facility] mild wall thickening (? Stercoral colitis); labs LFTs improved (  Alk Phos 1256 Bili 5.9), normal lipase (223), normal kidney function, pBNP >1700, albumin 2.1, and stable anemia Hgb 10.7 (MCV 88.2) from 11.1 on arrival--no baseline labs avai mg, 10 mg, Rectal, Daily PRN  Fleet Enema (FLEET) 7-19 GM/118ML enema 133 mL, 1 enema, Rectal, Once PRN  [COMPLETED] sodium chloride 0.9% IV bolus 500 mL, 500 mL, Intravenous, Once   Followed by  0.9% NaCl infusion, , Intravenous, Continuous  [COMPLETED] o able to speak in complete sentences per son providing translation   HENT: EOMI, PERRL, oropharynx is clear with moist mucosal membranes  Eyes: Sclerae are icteric  Neck:  Supple without nuchal rigidity  CV: Regular rate and rhythm, with normal S1 and S2  R gallbladder, common bile duct, pancreas, spleen, kidneys, IVC, and aorta.       PATIENT STATED HISTORY: (As transcribed by Technologist)              FINDINGS:     LIVER:  Normal size and echogenicity. No significant masses.    BILIARY:  Gallbladder poorly Registry.       PATIENT STATED HISTORY:(As transcribed by Technologist) Hussein Charter is here with weakness, jaundice and recent syncope        CONTRAST USED:  65cc of Omnipaque 350       FINDINGS:     LIVER: Lincoln Brochure is a nodular contour to the liver suggesting URINARY BLADDER:  No visible focal wall thickening, lesion, or calculus.     PELVIC NODES:  No adenopathy.     PELVIC ORGANS:  No visible mass.  Pelvic organs appropriate for patient age.     BONES:  No bony lesion or fracture.     LUNG BASES:  Moderate septation with contents appearing echogenic and CBD 4 mm; CT a/p IV suggests a nodular contour of liver with borderline hepatomegaly and mild degree of intrahepatic biliary tree dilation (? Mirizzi's syndrome) and moderate stool burden throughout the colon surg consult  MRCP to better define biliary tree  Role for ERCP/EUS determined,  pending above MR results  Surg onc consult placed  IVF, analgesia, anti-emetics per primary     Che Moran MD  7/26/2021  [facility] Gastroenterology

## 2021-07-26 NOTE — H&P (VIEW-ONLY)
[facility]                       Gastroenterology [address] [facility] Gastroenterology    [facility]:  [facility]    1933 MRN [id number]   [facility] 3NE-A Attending Javi Lugo MD   Hosp Day # 0 [facility] mild wall thickening (? Stercoral colitis); labs LFTs improved (  Alk Phos 1256 Bili 5.9), normal lipase (223), normal kidney function, pBNP >1700, albumin 2.1, and stable anemia Hgb 10.7 (MCV 88.2) from 11.1 on arrival--no baseline labs avai mg, 10 mg, Rectal, Daily PRN  Fleet Enema (FLEET) 7-19 GM/118ML enema 133 mL, 1 enema, Rectal, Once PRN  [COMPLETED] sodium chloride 0.9% IV bolus 500 mL, 500 mL, Intravenous, Once   Followed by  0.9% NaCl infusion, , Intravenous, Continuous  [COMPLETED] o able to speak in complete sentences per son providing translation   HENT: EOMI, PERRL, oropharynx is clear with moist mucosal membranes  Eyes: Sclerae are icteric  Neck:  Supple without nuchal rigidity  CV: Regular rate and rhythm, with normal S1 and S2  R gallbladder, common bile duct, pancreas, spleen, kidneys, IVC, and aorta.       PATIENT STATED HISTORY: (As transcribed by Technologist)              FINDINGS:     LIVER:  Normal size and echogenicity. No significant masses.    BILIARY:  Gallbladder poorly Registry.       PATIENT STATED HISTORY:(As transcribed by Technologist) Ele Dhaliwal is here with weakness, jaundice and recent syncope        CONTRAST USED:  65cc of Omnipaque 350       FINDINGS:     LIVER: Tilmon John is a nodular contour to the liver suggesting URINARY BLADDER:  No visible focal wall thickening, lesion, or calculus.     PELVIC NODES:  No adenopathy.     PELVIC ORGANS:  No visible mass.  Pelvic organs appropriate for patient age.     BONES:  No bony lesion or fracture.     LUNG BASES:  Moderate septation with contents appearing echogenic and CBD 4 mm; CT a/p IV suggests a nodular contour of liver with borderline hepatomegaly and mild degree of intrahepatic biliary tree dilation (? Mirizzi's syndrome) and moderate stool burden throughout the colon surg consult  MRCP to better define biliary tree  Role for ERCP/EUS determined,  pending above MR results  Surg onc consult placed  IVF, analgesia, anti-emetics per primary     Elsa Enrique MD  7/26/2021  [facility] Gastroenterology

## 2021-07-26 NOTE — CONSULTS
[facility]  Report of Consultation    Dean Champion Patient Status:  Inpatient    1933 MRN [id number]   [facility] 3NE-A Attending Javi Lugo MD   Hosp Day # 0 PCP PHYSICIAN NONSTAFF     Reason for Consultation:  aMteo Lange daughter. The patient has never had a colonoscopy and upper endoscopy.        History:  Past Medical History:   Diagnosis Date   • Essential hypertension    • Type II or unspecified type diabetes mellitus without mention of complication, not stated as un abnormal sleep patterns, increased activity, polydipsia and polyphagia  ENMT:  Negative for ear drainage, hearing loss and nasal drainage  Eyes:  Negative for eye discharge and vision loss  Gastrointestinal: Positive for vomiting, chronic constipation and 110 07/26/2021     07/26/2021    BILT 5.9 07/26/2021    ALB 2.1 07/26/2021    TP 6.1 07/26/2021     CT of the abdomen and pelvis  (Dr. Stef Noyola reviewed the report.)  FINDINGS:     LIVER: Hillery Lisandra is a nodular contour to the liver suggesting possible ci  No visible focal wall thickening, lesion, or calculus.     PELVIC NODES:  No adenopathy.     PELVIC ORGANS:  No visible mass.  Pelvic organs appropriate for patient age.     BONES:  No bony lesion or fracture.     LUNG BASES:  Moderate pulmonary fibrosis bile duct measures 4 mm, normal caliber. PANCREAS:  Normal.   SPLEEN:  Normal.   KIDNEYS:  Upper pole cyst right kidney 2.2 cm. Atrophy left kidney.  Right kidney measures 12.6 cm.  Left kidney measures 5.9 cm.    AORTA/IVC:  Normal.                      after having multiple bouts of emesis where she passed out in her bathroom. Her son called EMS. Of note, her son provides the history. The patient's primary noticed the patient was jaundiced and had recently ordered blood work.   Her son reports she was

## 2021-07-26 NOTE — PLAN OF CARE
Pt transferred to room 2717. Pt family aware. Belongings sent. Report given to [facility].

## 2021-07-27 ENCOUNTER — ANESTHESIA EVENT (OUTPATIENT)
Dept: ENDOSCOPY | Facility: HOSPITAL | Age: 86
DRG: 445 | End: 2021-07-27
Payer: MEDICARE

## 2021-07-27 LAB
ALBUMIN SERPL-MCNC: 2.2 G/DL (ref 3.4–5)
ALBUMIN/GLOB SERPL: 0.6 {RATIO} (ref 1–2)
ALP LIVER SERPL-CCNC: 1304 U/L
ALT SERPL-CCNC: 102 U/L
ANION GAP SERPL CALC-SCNC: 7 MMOL/L (ref 0–18)
AST SERPL-CCNC: 178 U/L (ref 15–37)
BILIRUB SERPL-MCNC: 6.1 MG/DL (ref 0.1–2)
BUN BLD-MCNC: 17 MG/DL (ref 7–18)
BUN/CREAT SERPL: 32.7 (ref 10–20)
CALCIUM BLD-MCNC: 8.3 MG/DL (ref 8.5–10.1)
CANCER AG19-9 SERPL-ACNC: 18.8 U/ML (ref ?–37)
CEA SERPL-MCNC: 6.1 NG/ML (ref ?–5)
CHLORIDE SERPL-SCNC: 96 MMOL/L (ref 98–112)
CO2 SERPL-SCNC: 26 MMOL/L (ref 21–32)
CREAT BLD-MCNC: 0.52 MG/DL
GLOBULIN PLAS-MCNC: 3.9 G/DL (ref 2.8–4.4)
GLUCOSE BLD-MCNC: 106 MG/DL (ref 70–99)
GLUCOSE BLD-MCNC: 108 MG/DL (ref 70–99)
GLUCOSE BLD-MCNC: 122 MG/DL (ref 70–99)
GLUCOSE BLD-MCNC: 136 MG/DL (ref 70–99)
GLUCOSE BLD-MCNC: 93 MG/DL (ref 70–99)
HAV IGM SER QL: 1.6 MG/DL (ref 1.6–2.6)
INR BLD: 1.04 (ref 0.89–1.11)
M PROTEIN MFR SERPL ELPH: 6.1 G/DL (ref 6.4–8.2)
OSMOLALITY SERPL CALC.SUM OF ELEC: 269 MOSM/KG (ref 275–295)
POTASSIUM SERPL-SCNC: 3.9 MMOL/L (ref 3.5–5.1)
PSA SERPL DL<=0.01 NG/ML-MCNC: 13.9 SECONDS (ref 12.4–14.6)
SODIUM SERPL-SCNC: 129 MMOL/L (ref 136–145)

## 2021-07-27 PROCEDURE — 99232 SBSQ HOSP IP/OBS MODERATE 35: CPT | Performed by: HOSPITALIST

## 2021-07-27 PROCEDURE — 99222 1ST HOSP IP/OBS MODERATE 55: CPT | Performed by: SURGERY

## 2021-07-27 PROCEDURE — 99232 SBSQ HOSP IP/OBS MODERATE 35: CPT | Performed by: SURGERY

## 2021-07-27 NOTE — PAYOR COMM NOTE
--------------  ADMISSION REVIEW     Payor: [address] #:  [id number]  Authorization Number: [id number]    Admit date: 7/26/21  Admit time: 12:31 AM       Admitting Physician: Corky Mei MD  Attending Physician:  Ag Dave, HPI.  Constitutional and vital signs reviewed. All other systems reviewed and negative except as noted above.     Physical Exam     ED Triage Vitals [07/25/21 2103]   BP (!) 189/75   Pulse 88   Resp 19   Temp 99.5 °F (37.5 °C)   Temp src Oral   SpO2 98 Narrative: The following orders were created for panel order CBC With Differential With Platelet.   Procedure                               Abnormality         Status                     ---------                               -----------         ------ syncope.       Disposition and Plan     Clinical Impression:  Painless jaundice  (primary encounter diagnosis)  Hyponatremia  Anemia, unspecified type  Nausea and vomiting in adult  Azotemia  Syncope, near     Disposition:  There is no disposition on file f icterus. Neck: No lymphadenopathy. No JVD. No carotid bruits. Respiratory: Clear to auscultation bilaterally. No wheezes. No rhonchi. Cardiovascular: S1, S2. Regular rate and rhythm. No murmurs, rubs or gallops. Equal pulses.    Chest and Back: No tender and UTI who presented to the [facility] after a syncopal episode following multiple episodes of vomiting. The patient is accompanied by her his son who serves as her .   The patient has lost 20 pounds unintentionally over the last 6 months and has [facility] with painless jaundice, pruritus, and a 20 pound unintentional weight loss over the last 6 months. CT and ultrasound with findings potential for neoplasm.      Plan:  1. Surgical oncology consulted for possible gallbladder mass  2.  Patient to und signs:   Temp:  [97.5 °F (36.4 °C)-99.5 °F (37.5 °C)] 97.8 °F (36.6 °C)  Pulse:  [57-88] 73  Resp:  [15-20] 18  BP: (147-189)/() 147/87     Wt Readings from Last 6 Encounters:  07/25/21 : 115 lb (52.2 kg)  03/02/11 : 170 lb (77.1 kg)  01/19/11 : 176 l Oral Nightly   • Insulin Aspart Pen  1-5 Units Subcutaneous TID CC and HS   • enoxaparin  40 mg Subcutaneous Daily         ASSESSMENT / PLAN:      4. Painless jaundice  5. HTN  6. DMII-monitor on insulin  7. Hyponatremia-fluid restrict; urine osm  8.  Ida Been Problem List:     Painless jaundice     Essential hypertension     Type 2 diabetes mellitus without complication (HCC)     Hyponatremia     Anemia, unspecified type     Nausea and vomiting in adult     Azotemia     Syncope, near        Painless jaundice  E concurrent supervision.  Images were archived.       PATIENT STATED HISTORY:(As transcribed by Technologist)  Patient offered no additional history at this time.            FINDINGS:     LIVER:  No enlargement, atrophy, abnormal density, or significant foca     Dictated by (CST): Austen Tubbs DO on 7/26/2021 at 11:21 PM              MEDICATIONS ADMINISTERED IN LAST 1 DAY:  atorvastatin (LIPITOR) tab 10 mg     Date Action Dose Route User    7/26/2021 2154 Given 10 mg Oral Tari Yeh, RN      carvedilol

## 2021-07-27 NOTE — PROGRESS NOTES
Assumed care of pt at 2300. Alert, oriented x4. Laurita speaking, son at bedside and translating. Denies any nausea or abd pain.   NPO. IVF infusing. C/o bilateral knee pain. Tramadol and hot packs provided. Pt and her son updated on plan of care.

## 2021-07-27 NOTE — PROGRESS NOTES
07/27/21 1732   Clinical Encounter Type   Visited With Patient and family together  (Son at bedside)   Routine Visit Follow-up   Continue Visiting No   Patient's Supportive Strategies/Resources  shared prayer with the pt and his son   Kacy Leonard

## 2021-07-27 NOTE — PROGRESS NOTES
Assumed care at [address]. Patient A&Ox4, donna speaking. Family at bedside to translate. Tele SR, on room air. Jaundiced. Denies pain. MRI MRCP completed. IVF infusing. Report given to oncoming RN at 2300.

## 2021-07-27 NOTE — PROGRESS NOTES
07/27/21 1738   Clinical Encounter Type   Visited With Patient and family together  (Son at bedside)   Routine Visit Follow-up   Continue Visiting No   Patient's Supportive Strategies/Resources  shared prayer with the pt and his son   Augusta Zarco

## 2021-07-27 NOTE — CONSULTS
[facility]      Attending:  I have reviewed the below listed note and evaluation by the physician assistant, Blanco Bonilla. I have personally seen and examined the patient and reviewed all relevant labs and reports.  I agree with h poor appetite, weight loss, and weakness over the last 6 months. He reports over the last weeks or so she has had jaundice and N/V prompting them to present to the ED.  CT abd/pelvis 7/25/2021 revealed the gallbladder wall is ill-defined and thickened with ASA EC 81 MG OR TBEC, Take 81 mg by mouth daily. , Disp: , Rfl:   SIMVASTATIN 20 MG OR TABS, Take 20 mg by mouth nightly.  , Disp: , Rfl:   OYSTER-DINESH 500 OR, Take 1 tablet by mouth daily.   , Disp: , Rfl:   ALPRAZolam 0.25 MG Oral Tab, Take 0.25 mg by jorge luis rebound), or CVA tenderness and soft and non-distended. Liver: no hepatomegaly. Spleen: no splenomegaly. Hernia: none palpable. Musculoskeletal: Extremities: no edema. Skin: Inspection and palpation: + jaundice.       Document Review:  CT abd/pelvis 7/1 surgical issues. There is no clear evidence for gallbladder mass as questioned. Will discuss case further with GI and general surgery. -DVT prophylaxis  -Ambulate/PT  -Bowel regimen for constipation.       Electronically Signed by:   Devorah Reilly PA-C  Dep

## 2021-07-27 NOTE — ANESTHESIA PREPROCEDURE EVALUATION
PRE-OP EVALUATION    Patient Name: Vanessa Bergman    Admit Diagnosis: Hyponatremia [E87.1]  Azotemia [R79.89]  Syncope, near [R55]  Nausea and vomiting in adult [R11.2]  Anemia, unspecified type [D64.9]  Painless jaundice [R17]    Pre-op Diagnosis: Elev PRN  Fleet Enema (FLEET) 7-19 GM/118ML enema 133 mL, 1 enema, Rectal, Once PRN  Isosorbide Mononitrate ER (IMDUR) 24 hr tab 60 mg, 60 mg, Oral, Daily  carvedilol (COREG) tab 25 mg, 25 mg, Oral, BID with meals  losartan Potassium (COZAAR) tab 50 mg, 50 mg, mouth 2 (two) times daily. , Disp: , Rfl: , More than a month at Unknown time        Allergies: Patient has no known allergies. Anesthesia Evaluation    Patient summary reviewed.     Anesthetic Complications  (-) history of anesthetic complications status verified and patient meets guidelines. Post-procedure pain management plan discussed with surgeon and patient.       Plan/risks discussed with: patient                Present on Admission:  **None**

## 2021-07-27 NOTE — PROGRESS NOTES
[facility] Gastroenterology     [facility] Patient Status:  Inpatient    1933 MRN [id number]   [facility] 7NE-A Attending Sebastien Chavez MD   Hosp Day # 1 PCP PHYSICIAN NONSTAFF 13.9   INR 1.04            No data recorded    [facility]      Imaging: Imaging data reviewed in Epic.     Medications:   • piperacillin-tazobactam  3.375 g Intravenous Q8H   • atorvastatin  10 mg Oral Nightly   • Insulin Aspart Pen  1-5 Units Subcutaneo 1. As noted on prior CT and ultrasound imaging, the gallbladder has a thickened irregular wall with mild pericholecystic fluid/inflammatory change.  The appearance is suspicious for potential cholecystitis.    2. No biliary ductal dilatation or filling de

## 2021-07-27 NOTE — PLAN OF CARE
Assumed care of patient at 0730. Patient remaining NPO per orders. VSS. Patient with question about procedure tomorrow. Provider notified. Consent not obtained. Needs met. Will continue to monitor.

## 2021-07-27 NOTE — PLAN OF CARE
Transferred from [facility] around [address] [employer] [address]. EDSON Anderson translating at bedside. All needs attended to.

## 2021-07-27 NOTE — PROGRESS NOTES
[facility] HOSPITALIST  Progress Note     Grove Siu Patient Status:  Inpatient    1933 MRN [id number]   [facility] 3NE-A Attending Saige Baltazar MD   Hosp Day # 1 PCP PHYSICIAN NONSTAFF     Chief Complaint: jaundice    S: Sherrell Joyce Pro-Calcitonin  No results for input(s): PCT in the last 168 hours. Cardiac  Recent Labs   Lab 07/25/21  2109 07/26/21  0621   TROP <0.045  --    PBNP  --  1,745*       Creatinine Kinase  No results for input(s): CK in the last 168 hours.     Infla

## 2021-07-27 NOTE — PROGRESS NOTES
[facility]  Progress Note    Tohmas Manrique Patient Status:  Inpatient    1933 MRN [id number]   [facility] 7NE-A Attending Mark Schmidt MD   Hosp Day # 1 PCP PHYSICIAN NONSTAFF     Subjective:   The patient is sleeping in be defects    Plan:  1. Will discuss case with Surgical Oncology  2. Continue NPO at this time  3. Continue IV abx  4.  DVT prophylaxis  5. GI prophylaxis    Total face to face time was 15 minutes, more than 50% of the time was spent in counseling and/or coord  Lung bases not well assessed with MRI.                          Impression:     CONCLUSION:     1.  As noted on prior CT and ultrasound imaging, the gallbladder has a thickened irregular wall with mild pericholecystic fluid/inflammatory change.  The appear

## 2021-07-28 ENCOUNTER — ANESTHESIA (OUTPATIENT)
Dept: ENDOSCOPY | Facility: HOSPITAL | Age: 86
DRG: 445 | End: 2021-07-28
Payer: MEDICARE

## 2021-07-28 LAB
ALBUMIN SERPL-MCNC: 1.9 G/DL (ref 3.4–5)
ALBUMIN/GLOB SERPL: 0.5 {RATIO} (ref 1–2)
ALP LIVER SERPL-CCNC: 1038 U/L
ALT SERPL-CCNC: 82 U/L
ANION GAP SERPL CALC-SCNC: 6 MMOL/L (ref 0–18)
AST SERPL-CCNC: 135 U/L (ref 15–37)
BASOPHILS # BLD AUTO: 0.01 X10(3) UL (ref 0–0.2)
BASOPHILS NFR BLD AUTO: 0.1 %
BILIRUB SERPL-MCNC: 5.2 MG/DL (ref 0.1–2)
BUN BLD-MCNC: 16 MG/DL (ref 7–18)
BUN/CREAT SERPL: 26.7 (ref 10–20)
CALCIUM BLD-MCNC: 8.2 MG/DL (ref 8.5–10.1)
CHLORIDE SERPL-SCNC: 97 MMOL/L (ref 98–112)
CO2 SERPL-SCNC: 28 MMOL/L (ref 21–32)
CREAT BLD-MCNC: 0.6 MG/DL
DEPRECATED RDW RBC AUTO: 62.4 FL (ref 35.1–46.3)
EOSINOPHIL # BLD AUTO: 0 X10(3) UL (ref 0–0.7)
EOSINOPHIL NFR BLD AUTO: 0 %
ERYTHROCYTE [DISTWIDTH] IN BLOOD BY AUTOMATED COUNT: 18.6 % (ref 11–15)
GLOBULIN PLAS-MCNC: 4.1 G/DL (ref 2.8–4.4)
GLUCOSE BLD-MCNC: 126 MG/DL (ref 70–99)
GLUCOSE BLD-MCNC: 132 MG/DL (ref 70–99)
GLUCOSE BLD-MCNC: 132 MG/DL (ref 70–99)
GLUCOSE BLD-MCNC: 136 MG/DL (ref 70–99)
GLUCOSE BLD-MCNC: 151 MG/DL (ref 70–99)
GLUCOSE BLD-MCNC: 152 MG/DL (ref 70–99)
HCT VFR BLD AUTO: 28.2 %
HGB BLD-MCNC: 8.9 G/DL
IMM GRANULOCYTES # BLD AUTO: 0.06 X10(3) UL (ref 0–1)
IMM GRANULOCYTES NFR BLD: 0.8 %
LYMPHOCYTES # BLD AUTO: 0.88 X10(3) UL (ref 1–4)
LYMPHOCYTES NFR BLD AUTO: 11.5 %
M PROTEIN MFR SERPL ELPH: 6 G/DL (ref 6.4–8.2)
MCH RBC QN AUTO: 29.5 PG (ref 26–34)
MCHC RBC AUTO-ENTMCNC: 31.6 G/DL (ref 31–37)
MCV RBC AUTO: 93.4 FL
MONOCYTES # BLD AUTO: 0.87 X10(3) UL (ref 0.1–1)
MONOCYTES NFR BLD AUTO: 11.4 %
NEUTROPHILS # BLD AUTO: 5.8 X10 (3) UL (ref 1.5–7.7)
NEUTROPHILS # BLD AUTO: 5.8 X10(3) UL (ref 1.5–7.7)
NEUTROPHILS NFR BLD AUTO: 76.2 %
OSMOLALITY SERPL CALC.SUM OF ELEC: 275 MOSM/KG (ref 275–295)
PLATELET # BLD AUTO: 255 10(3)UL (ref 150–450)
POTASSIUM SERPL-SCNC: 3.9 MMOL/L (ref 3.5–5.1)
RBC # BLD AUTO: 3.02 X10(6)UL
SODIUM SERPL-SCNC: 131 MMOL/L (ref 136–145)
WBC # BLD AUTO: 7.6 X10(3) UL (ref 4–11)

## 2021-07-28 PROCEDURE — 0FB48ZX EXCISION OF GALLBLADDER, VIA NATURAL OR ARTIFICIAL OPENING ENDOSCOPIC, DIAGNOSTIC: ICD-10-PCS | Performed by: INTERNAL MEDICINE

## 2021-07-28 PROCEDURE — BF43ZZZ ULTRASONOGRAPHY OF GALLBLADDER AND BILE DUCTS: ICD-10-PCS | Performed by: INTERNAL MEDICINE

## 2021-07-28 PROCEDURE — 99232 SBSQ HOSP IP/OBS MODERATE 35: CPT | Performed by: HOSPITALIST

## 2021-07-28 RX ORDER — NITROFURANTOIN 25 MG/5ML
50 SUSPENSION ORAL NIGHTLY
Status: DISCONTINUED | OUTPATIENT
Start: 2021-07-28 | End: 2021-08-04

## 2021-07-28 RX ORDER — LIDOCAINE HYDROCHLORIDE 10 MG/ML
INJECTION, SOLUTION EPIDURAL; INFILTRATION; INTRACAUDAL; PERINEURAL AS NEEDED
Status: DISCONTINUED | OUTPATIENT
Start: 2021-07-28 | End: 2021-07-28 | Stop reason: SURG

## 2021-07-28 RX ORDER — SODIUM CHLORIDE, SODIUM LACTATE, POTASSIUM CHLORIDE, CALCIUM CHLORIDE 600; 310; 30; 20 MG/100ML; MG/100ML; MG/100ML; MG/100ML
INJECTION, SOLUTION INTRAVENOUS CONTINUOUS
Status: DISCONTINUED | OUTPATIENT
Start: 2021-07-28 | End: 2021-07-29

## 2021-07-28 RX ORDER — DEXTROSE MONOHYDRATE 25 G/50ML
50 INJECTION, SOLUTION INTRAVENOUS
Status: DISCONTINUED | OUTPATIENT
Start: 2021-07-28 | End: 2021-07-28

## 2021-07-28 RX ORDER — NALOXONE HYDROCHLORIDE 0.4 MG/ML
80 INJECTION, SOLUTION INTRAMUSCULAR; INTRAVENOUS; SUBCUTANEOUS AS NEEDED
Status: ACTIVE | OUTPATIENT
Start: 2021-07-28 | End: 2021-07-28

## 2021-07-28 RX ADMIN — SODIUM CHLORIDE: 9 INJECTION, SOLUTION INTRAVENOUS at 11:55:00

## 2021-07-28 RX ADMIN — LIDOCAINE HYDROCHLORIDE 100 MG: 10 INJECTION, SOLUTION EPIDURAL; INFILTRATION; INTRACAUDAL; PERINEURAL at 11:52:00

## 2021-07-28 RX ADMIN — SODIUM CHLORIDE: 9 INJECTION, SOLUTION INTRAVENOUS at 11:46:00

## 2021-07-28 NOTE — PHYSICAL THERAPY NOTE
Attempted to see pt for PT tx session. Per discussion with RN pt is off the floor for a procedure and will not be appropriate for therapy today. Will follow and re-attempt as able and appropriate.

## 2021-07-28 NOTE — INTERVAL H&P NOTE
Pre-op Diagnosis: Elevated LFTs [R79.89]    The above referenced H&P was reviewed by Maximo Drew DO on 7/28/2021, the patient was examined and no significant changes have occurred in the patient's condition since the H&P was performed.   I discussed wi

## 2021-07-28 NOTE — ANESTHESIA POSTPROCEDURE EVALUATION
[address] Patient Status:  Inpatient   Age/Gender 80year old female MRN [id number]   Location [address] [address] Attending Michael Haji, [facility] Day # 2 PCP PHYSICIAN NONSTAFF       Anesthesia Post-op Note

## 2021-07-28 NOTE — OPERATIVE REPORT
Vanessa Bergman Patient Status:  Inpatient    1933 MRN [id number]   Location [id number] [place] [address] Attending Sage Coates, [address] [facility] Day # 2 PCP PHYSICIAN NONSTAFF     PREOPERATIVE DIAGNOSIS/INDICATION: Elevated liver chemi cytopathology revealed only debris. No malignant cells. The pancreas duct was normal in caliber. There was lobularity of the body and tail of the pancreas suggestive of chronic pancreatitis. There was a subcentimeter cyst in the pancreas body.       IMPRESS

## 2021-07-28 NOTE — PROGRESS NOTES
[facility]  Progress Note    Thomas Manrique Patient Status:  Inpatient    1933 MRN [id number]   [facility] 7NE-A Attending Paolo Lewis, [address] [facility] Day # 2 PCP PHYSICIAN NONSTAFF     Subjective: The patient is resting in bed. Azotemia     Syncope, near        Plan:  1. ERCP today. 2. Further recommendations pending results of ERCP. 3. Continue NPO for procedure. May have a diet following ERCP from a surgical standpoint. 4. Continue IV antibiotics with Zosyn.   5. DVT prophy

## 2021-07-28 NOTE — PROGRESS NOTES
[facility]  Progress Note     Indira Romero Patient Status:  Inpatient    1933 MRN [id number]   [facility] 3NE-A Attending Elisha Briceno MD   Hosp Day # 2 PCP PHYSICIAN NONSTAFF     Chief Complaint: jaundice    S: [place] Component Value Date    COVID19 Not Detected 07/25/2021       Pro-Calcitonin  No results for input(s): PCT in the last 168 hours.     Cardiac  Recent Labs   Lab 07/25/21 2109 07/26/21  0621   TROP <0.045  --    PBNP  --  1,745*       Creatinine Kinase  N

## 2021-07-28 NOTE — PROGRESS NOTES
[facility]    Progress Note    Noble Morales Patient Status:  Inpatient    1933 MRN [id number]   [facility] 7NE-A Attending Lisa Atwood, [address] [facility] Day # 2 PCP PHYSICIAN NONSTAFF     Subjective:  Noble Morales is a 80 ye

## 2021-07-29 ENCOUNTER — TELEPHONE (OUTPATIENT)
Dept: SURGERY | Facility: CLINIC | Age: 86
End: 2021-07-29

## 2021-07-29 DIAGNOSIS — K80.18 CALCULUS OF GALLBLADDER WITH OTHER CHOLECYSTITIS WITHOUT OBSTRUCTION: Primary | ICD-10-CM

## 2021-07-29 LAB
ALBUMIN SERPL-MCNC: 1.7 G/DL (ref 3.4–5)
ALBUMIN/GLOB SERPL: 0.4 {RATIO} (ref 1–2)
ALP LIVER SERPL-CCNC: 948 U/L
ALT SERPL-CCNC: 65 U/L
ANION GAP SERPL CALC-SCNC: 4 MMOL/L (ref 0–18)
AST SERPL-CCNC: 96 U/L (ref 15–37)
BILIRUB SERPL-MCNC: 4.7 MG/DL (ref 0.1–2)
BUN BLD-MCNC: 13 MG/DL (ref 7–18)
CALCIUM BLD-MCNC: 8.8 MG/DL (ref 8.5–10.1)
CHLORIDE SERPL-SCNC: 99 MMOL/L (ref 98–112)
CO2 SERPL-SCNC: 28 MMOL/L (ref 21–32)
CREAT BLD-MCNC: 0.39 MG/DL
GLOBULIN PLAS-MCNC: 4.1 G/DL (ref 2.8–4.4)
GLUCOSE BLD-MCNC: 106 MG/DL (ref 70–99)
GLUCOSE BLD-MCNC: 114 MG/DL (ref 70–99)
GLUCOSE BLD-MCNC: 131 MG/DL (ref 70–99)
GLUCOSE BLD-MCNC: 137 MG/DL (ref 70–99)
GLUCOSE BLD-MCNC: 145 MG/DL (ref 70–99)
GLUCOSE BLD-MCNC: 224 MG/DL (ref 70–99)
M PROTEIN MFR SERPL ELPH: 5.8 G/DL (ref 6.4–8.2)
OSMOLALITY SERPL CALC.SUM OF ELEC: 273 MOSM/KG (ref 275–295)
POTASSIUM SERPL-SCNC: 4.3 MMOL/L (ref 3.5–5.1)
SODIUM SERPL-SCNC: 131 MMOL/L (ref 136–145)

## 2021-07-29 PROCEDURE — 99232 SBSQ HOSP IP/OBS MODERATE 35: CPT | Performed by: HOSPITALIST

## 2021-07-29 PROCEDURE — 99233 SBSQ HOSP IP/OBS HIGH 50: CPT | Performed by: SURGERY

## 2021-07-29 PROCEDURE — 99231 SBSQ HOSP IP/OBS SF/LOW 25: CPT | Performed by: PHYSICIAN ASSISTANT

## 2021-07-29 NOTE — CM/SW NOTE
Accepting [facility]IMINO list given to pt's son - he will look over the list and choose a MAXIMINO for pt. Yvonne Prasad started but need MAXIMINO choice to complete.

## 2021-07-29 NOTE — PROGRESS NOTES
[facility]  Progress Note     Laura Guerrero Patient Status:  Inpatient    1933 MRN [id number]   [facility] 3NE-A Attending Jessi Ortega MD   Hosp Day # 3 PCP PHYSICIAN NONSTAFF     Chief Complaint: jaundice    S: [place] Results    COVID-19  Lab Results   Component Value Date    COVID19 Not Detected 07/25/2021       Pro-Calcitonin  No results for input(s): PCT in the last 168 hours.     Cardiac  Recent Labs   Lab 07/25/21 2109 07/26/21  0621   TROP <0.045  --    PBNP  --

## 2021-07-29 NOTE — PHYSICAL THERAPY NOTE
PHYSICAL THERAPY TREATMENT NOTE - INPATIENT    Room Number: 5329/7283-N     Session: 1   Number of Visits to Meet Established Goals: 5    Presenting Problem: Painless jaundice, hyponatremia, near syncope and anemia  History related to current admission: P Turning over in bed (including adjusting bedclothes, sheets and blankets)?: A Little   -   Sitting down on and standing up from a chair with arms (e.g., wheelchair, bedside commode, etc.): A Lot   -   Moving from lying on back to sitting on the side of th of Session: In bed;Needs met;Call light within reach;RN aware of session/findings; All patient questions and concerns addressed; Alarm set; Family present    ASSESSMENT     The patient is limited in mobility by pain in bilateral knees. R greater than TRACY alas

## 2021-07-29 NOTE — PLAN OF CARE
Assumed care at 0730  A&Ox4  On RA, NSR on tele  PRN tramadol given for chronic knee pain  Worked with PT/OT today, they rec MAXIMINO  Son at bedside updated on POC  Will continue to monitor              Problem: Diabetes/Glucose Control  Goal: Glucose maintain

## 2021-07-29 NOTE — PROGRESS NOTES
[facility]  Progress Note    Jaylen Vasquez Patient Status:  Inpatient    1933 MRN [id number]   [facility] 7NE-A Attending Aisha Rockwell, 160[address] [facility] Day # 3 PCP PHYSICIAN NONSTAFF     Subjective:  Pt without complaints.   Yoanna Cisneros laparoscopy. 5. Pt's son now requesting rehab for the patient. Will ask social work to evaluate.   6. D/W Dr. Jimi Francisco and surgical oncology      Robby Hazel MD  7/29/2021  3:18 PM

## 2021-07-29 NOTE — PROGRESS NOTES
[facility]    Progress Note    Merrill Darden Patient Status:  Inpatient    1933 MRN [id number]   [facility] 7NE-A Attending Michael Haji, [address] [facility] Day # 3 PCP PHYSICIAN NONSTAFF     Subjective:  Merrill Darden is a 80 ye [address],  [facility], [address] [place] [address]  T: [phone]  F: [phone]  Pager [phone]

## 2021-07-29 NOTE — PLAN OF CARE
Assumed care at [address] [place] [address] Road  Denies n/v  Knee pain managed with tramadol  Family at bedside  Needs met will continue to monitor

## 2021-07-29 NOTE — CM/SW NOTE
PT is now recommending MAXIMINO for pt. Pt's son is now agreeable to MAXIMINO. Referrals sent for MAXIMINO via [facility] - waiting for responses. DON screen requested.

## 2021-07-29 NOTE — OCCUPATIONAL THERAPY NOTE
OCCUPATIONAL THERAPY TREATMENT NOTE - INPATIENT     Room Number: 5056/4780-A  Session: 1  Number of Visits to Meet Established Goals: 5    Presenting Problem: jaundice    History related to current admission:    UPDATES 7/29-->patient to have cholecystecto Deidre   • [address] [facility] CATARACT EXTRACAP,INSERT LENS  1/19/2011    Performed by Latesha Hankins at [address], smiling    Patient self-stated goal is not stated    OBJECTIVE  Precautions: Bed/chair alarm;  neede arrangement. OT educated patient's son that SW can be contacted to see what MAXIMINO/SNF options are. This writer spoke with SW and changed therapy recommendation to MAXIMINO. RN was updated.      Patient End of Session: In bed;Needs met;Call light within reach;RN

## 2021-07-30 ENCOUNTER — TELEPHONE (OUTPATIENT)
Dept: SURGERY | Facility: CLINIC | Age: 86
End: 2021-07-30

## 2021-07-30 DIAGNOSIS — K80.18 CALCULUS OF GALLBLADDER WITH OTHER CHOLECYSTITIS WITHOUT OBSTRUCTION: Primary | ICD-10-CM

## 2021-07-30 LAB
ALBUMIN SERPL-MCNC: 1.7 G/DL (ref 3.4–5)
ALBUMIN/GLOB SERPL: 0.4 {RATIO} (ref 1–2)
ALP LIVER SERPL-CCNC: 964 U/L
ALT SERPL-CCNC: 76 U/L
ANION GAP SERPL CALC-SCNC: 6 MMOL/L (ref 0–18)
AST SERPL-CCNC: 146 U/L (ref 15–37)
BILIRUB SERPL-MCNC: 5.2 MG/DL (ref 0.1–2)
BUN BLD-MCNC: 13 MG/DL (ref 7–18)
CALCIUM BLD-MCNC: 8.4 MG/DL (ref 8.5–10.1)
CHLORIDE SERPL-SCNC: 97 MMOL/L (ref 98–112)
CO2 SERPL-SCNC: 29 MMOL/L (ref 21–32)
CREAT BLD-MCNC: 0.41 MG/DL
GLOBULIN PLAS-MCNC: 4 G/DL (ref 2.8–4.4)
GLUCOSE BLD-MCNC: 100 MG/DL (ref 70–99)
GLUCOSE BLD-MCNC: 112 MG/DL (ref 70–99)
GLUCOSE BLD-MCNC: 135 MG/DL (ref 70–99)
GLUCOSE BLD-MCNC: 158 MG/DL (ref 70–99)
GLUCOSE BLD-MCNC: 170 MG/DL (ref 70–99)
M PROTEIN MFR SERPL ELPH: 5.7 G/DL (ref 6.4–8.2)
OSMOLALITY SERPL CALC.SUM OF ELEC: 275 MOSM/KG (ref 275–295)
POTASSIUM SERPL-SCNC: 4 MMOL/L (ref 3.5–5.1)
SODIUM SERPL-SCNC: 132 MMOL/L (ref 136–145)

## 2021-07-30 PROCEDURE — 99232 SBSQ HOSP IP/OBS MODERATE 35: CPT | Performed by: HOSPITALIST

## 2021-07-30 NOTE — PLAN OF CARE
Pt stable overnight. Son remains at the bedside. Norco given x 1 for chronic lt knee pain. Afebrile. NSR on tele. O2 sats >92%. Purewick in place w/ james colored uo. All safety precautions in place. Will monitor.     Problem: [address]

## 2021-07-30 NOTE — PROGRESS NOTES
[facility]  Progress Note     Oleta Flattery Patient Status:  Inpatient    1933 MRN [id number]   [facility] 3NE-A Attending Angi Messina MD   Hosp Day # 4 PCP PHYSICIAN NONSTAFF     Chief Complaint: jaundice    S: Leann Posadas Results    COVID-19  Lab Results   Component Value Date    COVID19 Not Detected 07/25/2021       Pro-Calcitonin  No results for input(s): PCT in the last 168 hours.     Cardiac  Recent Labs   Lab 07/25/21 2109 07/26/21  0621   TROP <0.045  --    PBNP  --

## 2021-07-30 NOTE — CM/SW NOTE
Azar Knight pending case [id number]. Spoke with son about his MAXIMINO choice for pt - he says he is visting the facilities and will call back with his choice.

## 2021-07-30 NOTE — PROGRESS NOTES
[facility]    Progress Note    Miah Standing Patient Status:  Inpatient    1933 MRN [id number]   [facility] 7NE-A Attending Marcella Treviño, 1604 [facility] Day # 4 PCP PHYSICIAN NONSTAFF     Subjective:  Miah Standing is a 80 ye

## 2021-07-30 NOTE — PROGRESS NOTES
[facility] Gastroenterology     [facility] Patient Status:  Inpatient    1933 MRN [id number]   [facility] 7NE-A Attending Javi Lugo MD   Hosp Day # 4 PCP PHYSICIAN NONSTAFF Recent Labs   Lab 07/27/21  0539   PTP 13.9   INR 1.04            No data recorded    @[facility](      Imaging: Imaging data reviewed in Epic.     Medications:   • nitrofurantoin  50 mg Oral Nightly   • atorvastatin  10 mg Oral Nightly   • Insulin A Impression   CONCLUSION:     1. As noted on prior CT and ultrasound imaging, the gallbladder has a thickened irregular wall with mild pericholecystic fluid/inflammatory change.  The appearance is suspicious for potential cholecystitis.    2. No biliary du

## 2021-07-30 NOTE — PHYSICAL THERAPY NOTE
PHYSICAL THERAPY TREATMENT NOTE - INPATIENT    Room Number: 2010/1007-T     Session: 2  Number of Visits to Meet Established Goals: 5    Presenting Problem: Painless jaundice, hyponatremia, near syncope and anemia  History related to current admission: Pt bed (including adjusting bedclothes, sheets and blankets)?: A Little   -   Sitting down on and standing up from a chair with arms (e.g., wheelchair, bedside commode, etc.): A Lot   -   Moving from lying on back to sitting on the side of the bed?: A Lot   H addressed; Alarm set; Family present (alarm pad placed, PCT Apolonia notified of missing green box.)    ASSESSMENT   Pain meds coordinated prior to session.  Pt tolerated standing activity better today, continues to need mod to max assist to stand and for transf

## 2021-07-30 NOTE — PLAN OF CARE
Assumed pt care @ 1530. Pt alert and oriented x4, NSR, on room air. Cleared for dc, awaiting insurance auth for MAXIMINO. Pt to f/u with [facility] surgery as OP. Pt sons at bedside. Updated on plan of care.

## 2021-07-30 NOTE — PLAN OF CARE
Dr. Germaine Marshall from surgery  said referral for  outpatient [facility]  surgical group was requested  Patient has fair appetite deny nausea  Deny dyspnea   Waiting for MAXIMINO  patient son check out MAXIMINO  and will tell us  which one they choose  Problem: RESPIRATORY - ADUL ex. Angina  - Evaluate fluid balance, assess for edema, trend weights  Outcome: Progressing     Problem: GASTROINTESTINAL - ADULT  Goal: Maintains or returns to baseline bowel function  Description: INTERVENTIONS:  - Assess bowel function  - Maintain adequ

## 2021-07-30 NOTE — PROGRESS NOTES
[facility]  Progress Note    Dara Chester Patient Status:  Inpatient    1933 MRN [id number]   [facility] 7NE-A Attending John Delcid, 160[address] [facility] Day # 4 PCP PHYSICIAN NONSTAFF     Subjective: The patient is resting in bed. gallbladder     1. The patient is cleared for discharge once placement is obtained. According to the , the patient's son is actively looking at rehab facilities for his mother.   2. The patient will require cholecystectomy with liver biopsy an

## 2021-07-30 NOTE — PROGRESS NOTES
[address] [facility] Gastroenterology     [facility]:  [facility]    1933 MRN [id number]   [facility] 7NE-A Attending Gilmar Benoit MD   Hosp Day # 3 PCP PHYSICIAN NONSTAFF 07/27/21  0539   PTP 13.9   INR 1.04            No data recorded    @[facility](      Imaging: Imaging data reviewed in Epic.     Medications:   • nitrofurantoin  50 mg Oral Nightly   • atorvastatin  10 mg Oral Nightly   • Insulin Aspart Pen  1-5 Units Sub CONCLUSION:     1. As noted on prior CT and ultrasound imaging, the gallbladder has a thickened irregular wall with mild pericholecystic fluid/inflammatory change.  The appearance is suspicious for potential cholecystitis.    2. No biliary ductal dilatati

## 2021-07-30 NOTE — CM/SW NOTE
Family has chosen [facility] for [employer]. [facility] reserved in [facility]. Called [facility] to notify them of pt's [facility]INO choice. Waiting for insurance auth from Hayden Schneider. Once insurance Holley Boone is received from Hayden Schneider pt can dc to [facility].

## 2021-07-31 LAB
ALBUMIN SERPL-MCNC: 1.7 G/DL (ref 3.4–5)
ALBUMIN/GLOB SERPL: 0.4 {RATIO} (ref 1–2)
ALP LIVER SERPL-CCNC: 937 U/L
ALT SERPL-CCNC: 99 U/L
ANION GAP SERPL CALC-SCNC: 7 MMOL/L (ref 0–18)
AST SERPL-CCNC: 198 U/L (ref 15–37)
BILIRUB SERPL-MCNC: 6.1 MG/DL (ref 0.1–2)
BUN BLD-MCNC: 11 MG/DL (ref 7–18)
CALCIUM BLD-MCNC: 8.5 MG/DL (ref 8.5–10.1)
CHLORIDE SERPL-SCNC: 95 MMOL/L (ref 98–112)
CO2 SERPL-SCNC: 29 MMOL/L (ref 21–32)
CREAT BLD-MCNC: 0.38 MG/DL
GLOBULIN PLAS-MCNC: 4.2 G/DL (ref 2.8–4.4)
GLUCOSE BLD-MCNC: 110 MG/DL (ref 70–99)
GLUCOSE BLD-MCNC: 123 MG/DL (ref 70–99)
GLUCOSE BLD-MCNC: 130 MG/DL (ref 70–99)
GLUCOSE BLD-MCNC: 131 MG/DL (ref 70–99)
GLUCOSE BLD-MCNC: 150 MG/DL (ref 70–99)
HAV IGM SER QL: 1.6 MG/DL (ref 1.6–2.6)
M PROTEIN MFR SERPL ELPH: 5.9 G/DL (ref 6.4–8.2)
OSMOLALITY SERPL CALC.SUM OF ELEC: 272 MOSM/KG (ref 275–295)
POTASSIUM SERPL-SCNC: 4.1 MMOL/L (ref 3.5–5.1)
SODIUM SERPL-SCNC: 131 MMOL/L (ref 136–145)

## 2021-07-31 PROCEDURE — 99232 SBSQ HOSP IP/OBS MODERATE 35: CPT | Performed by: HOSPITALIST

## 2021-07-31 RX ORDER — MAGNESIUM OXIDE 400 MG (241.3 MG MAGNESIUM) TABLET
400 TABLET ONCE
Status: COMPLETED | OUTPATIENT
Start: 2021-07-31 | End: 2021-07-31

## 2021-07-31 RX ORDER — POLYETHYLENE GLYCOL 3350 17 G/17G
17 POWDER, FOR SOLUTION ORAL DAILY PRN
Refills: 0 | Status: SHIPPED | COMMUNITY
Start: 2021-07-31

## 2021-07-31 RX ORDER — TRAMADOL HYDROCHLORIDE 50 MG/1
50 TABLET ORAL EVERY 6 HOURS PRN
Qty: 15 TABLET | Refills: 0 | Status: SHIPPED | OUTPATIENT
Start: 2021-07-31

## 2021-07-31 NOTE — PROGRESS NOTES
[facility]  Progress Note     Jonvangie Inman Patient Status:  Inpatient    1933 MRN [id number]   [facility] 3NE-A Attending Celeste Gilliam MD   Hosp Day # 5 PCP PHYSICIAN NONSTAFF     Chief Complaint: jaundice    S: Glorious Mckeon Results    COVID-19  Lab Results   Component Value Date    COVID19 Not Detected 07/25/2021       Pro-Calcitonin  No results for input(s): PCT in the last 168 hours.     Cardiac  Recent Labs   Lab 07/25/21 2109 07/26/21  0621   TROP <0.045  --    PBNP  --

## 2021-07-31 NOTE — CM/SW NOTE
Informed by nurse that patient is medically cleared to go to [facility].   Call placed to [facility] --informed that  Clinical review received and requested yesterday @ 2:111 pm--this process could take up to 48-hours before known, updated

## 2021-07-31 NOTE — PLAN OF CARE
Assumed pt care at 1 for the night shift. Pt resting in bed. Cont to have mild lt knee  discomfort. Warm packs applied on request.  VSS. Afebrile. NSR on tele. O2 sats > 92% on RA. Denies any n/v. Jaundice slightly improving.   Plan for outpatient cho METABOLIC/FLUID AND ELECTROLYTES - ADULT  Goal: Glucose maintained within prescribed range  Description: INTERVENTIONS:  - Monitor Blood Glucose as ordered  - Assess for signs and symptoms of hyperglycemia and hypoglycemia  - Administer ordered medications

## 2021-07-31 NOTE — PLAN OF CARE
Assumed care at 0730. A&OX4. Language Barrier. Family at bedside for translation. Tele - SR with frequent PVC\"s. Skin - Jaundice. Up in chair today. C/O chronic Rt knee pain. Tramadol given and Hot packs utilized.   Incontinent of Urine, Purewick u assess for edema, trend weights  Outcome: Progressing  Goal: Absence of cardiac arrhythmias or at baseline  Description: INTERVENTIONS:  - Continuous cardiac monitoring, monitor vital signs, obtain 12 lead EKG if indicated  - Evaluate effectiveness of anti I&O, WT and lab values  - Obtain nutritional consult as needed  - Optimize oral hygiene and moisture  - Encourage food from home; allow for food preferences  - Enhance eating environment  Outcome: Progressing  Goal: Achieves appropriate nutritional intake ADULT  Goal: Maintains hematologic stability  Description: INTERVENTIONS  - Assess for signs and symptoms of bleeding or hemorrhage  - Monitor labs and vital signs for trends  - Administer supportive blood products/factors, fluids and medications as ordere

## 2021-08-01 LAB
ALBUMIN SERPL-MCNC: 1.6 G/DL (ref 3.4–5)
ALBUMIN/GLOB SERPL: 0.4 {RATIO} (ref 1–2)
ALP LIVER SERPL-CCNC: 936 U/L
ALT SERPL-CCNC: 107 U/L
ANION GAP SERPL CALC-SCNC: 6 MMOL/L (ref 0–18)
AST SERPL-CCNC: 203 U/L (ref 15–37)
BILIRUB SERPL-MCNC: 6.6 MG/DL (ref 0.1–2)
BUN BLD-MCNC: 10 MG/DL (ref 7–18)
CALCIUM BLD-MCNC: 8.2 MG/DL (ref 8.5–10.1)
CHLORIDE SERPL-SCNC: 94 MMOL/L (ref 98–112)
CO2 SERPL-SCNC: 28 MMOL/L (ref 21–32)
CREAT BLD-MCNC: 0.33 MG/DL
GLOBULIN PLAS-MCNC: 3.9 G/DL (ref 2.8–4.4)
GLUCOSE BLD-MCNC: 110 MG/DL (ref 70–99)
GLUCOSE BLD-MCNC: 139 MG/DL (ref 70–99)
GLUCOSE BLD-MCNC: 160 MG/DL (ref 70–99)
GLUCOSE BLD-MCNC: 176 MG/DL (ref 70–99)
GLUCOSE BLD-MCNC: 177 MG/DL (ref 70–99)
HAV IGM SER QL: 1.7 MG/DL (ref 1.6–2.6)
M PROTEIN MFR SERPL ELPH: 5.5 G/DL (ref 6.4–8.2)
OSMOLALITY SERPL CALC.SUM OF ELEC: 266 MOSM/KG (ref 275–295)
POTASSIUM SERPL-SCNC: 4 MMOL/L (ref 3.5–5.1)
SODIUM SERPL-SCNC: 128 MMOL/L (ref 136–145)

## 2021-08-01 PROCEDURE — 99232 SBSQ HOSP IP/OBS MODERATE 35: CPT | Performed by: HOSPITALIST

## 2021-08-01 RX ORDER — MAGNESIUM OXIDE 400 MG (241.3 MG MAGNESIUM) TABLET
400 TABLET ONCE
Status: COMPLETED | OUTPATIENT
Start: 2021-08-01 | End: 2021-08-01

## 2021-08-01 RX ORDER — DOCUSATE SODIUM 100 MG/1
100 CAPSULE, LIQUID FILLED ORAL 2 TIMES DAILY
Status: DISCONTINUED | OUTPATIENT
Start: 2021-08-01 | End: 2021-08-04

## 2021-08-01 NOTE — PLAN OF CARE
Assumed pt care at [address] [place] [address] for the night shift. Pt resting in bed. A/O x 3, forgetful. VSS. Afebrile. NSR on tele. O2 sats > 92% on RA. Denies any pain or discomfort. Purewick replaced for the night. Urine color appears lighter james.  Jaundice slightly i for signs of decreased coronary artery perfusion - ex.  Angina  - Evaluate fluid balance, assess for edema, trend weights  Outcome: Progressing  Goal: Absence of cardiac arrhythmias or at baseline  Description: INTERVENTIONS:  - Continuous cardiac monitorin Assess ability and encourage patient to participate in ADLs to maximize function  - Promote sitting position while performing ADLs such as feeding, grooming, and bathing  - Educate and encourage patient/family in tolerated functional activity level and pre

## 2021-08-01 NOTE — PROGRESS NOTES
[facility]  Progress Note     Perfecto Siu Patient Status:  Inpatient    1933 MRN [id number]   [facility] 3NE-A Attending Saige Baltazar MD   Hosp Day # 6 PCP PHYSICIAN NONSTAFF     Chief Complaint: jaundice    S: [place] COVID-19 Lab Results    COVID-19  Lab Results   Component Value Date    COVID19 Not Detected 07/25/2021       Pro-Calcitonin  No results for input(s): PCT in the last 168 hours.     Cardiac  Recent Labs   Lab 07/25/21 2109 07/26/21  0621   TROP <0.045

## 2021-08-01 NOTE — PLAN OF CARE
Assumed care at 0730. A&OX4, forgetful. Tele - SR. Skin Jaundice. Up in chair today. Waiting Insurance approval for MAXIMINOJessie Wendy at bedside. Resting comfortably. Will continue to monitor.     Problem: Diabetes/Glucose Control  Goal: Glucose maintained with Continuous cardiac monitoring, monitor vital signs, obtain 12 lead EKG if indicated  - Evaluate effectiveness of antiarrhythmic and heart rate control medications as ordered  - Initiate emergency measures for life threatening arrhythmias  - Monitor electro food preferences  - Enhance eating environment  Outcome: Progressing  Goal: Achieves appropriate nutritional intake (bariatric)  Description: INTERVENTIONS:  - Monitor for over-consumption  - Identify factors contributing to increased intake, treat as appr and vital signs for trends  - Administer supportive blood products/factors, fluids and medications as ordered and appropriate  - Administer supportive blood products/factors as ordered and appropriate  Outcome: Progressing     Problem: Impaired Functional

## 2021-08-02 LAB
ALBUMIN SERPL-MCNC: 1.6 G/DL (ref 3.4–5)
ALBUMIN/GLOB SERPL: 0.4 {RATIO} (ref 1–2)
ALP LIVER SERPL-CCNC: 912 U/L
ALT SERPL-CCNC: 103 U/L
ANION GAP SERPL CALC-SCNC: 5 MMOL/L (ref 0–18)
AST SERPL-CCNC: 186 U/L (ref 15–37)
BILIRUB DIRECT SERPL-MCNC: 5.7 MG/DL (ref 0–0.2)
BILIRUB SERPL-MCNC: 6.6 MG/DL (ref 0.1–2)
BUN BLD-MCNC: 11 MG/DL (ref 7–18)
CALCIUM BLD-MCNC: 8.5 MG/DL (ref 8.5–10.1)
CHLORIDE SERPL-SCNC: 97 MMOL/L (ref 98–112)
CO2 SERPL-SCNC: 29 MMOL/L (ref 21–32)
CREAT BLD-MCNC: 0.38 MG/DL
GLOBULIN PLAS-MCNC: 4 G/DL (ref 2.8–4.4)
GLUCOSE BLD-MCNC: 110 MG/DL (ref 70–99)
GLUCOSE BLD-MCNC: 120 MG/DL (ref 70–99)
GLUCOSE BLD-MCNC: 155 MG/DL (ref 70–99)
GLUCOSE BLD-MCNC: 170 MG/DL (ref 70–99)
GLUCOSE BLD-MCNC: 196 MG/DL (ref 70–99)
HAV IGM SER QL: 1.8 MG/DL (ref 1.6–2.6)
M PROTEIN MFR SERPL ELPH: 5.6 G/DL (ref 6.4–8.2)
OSMOLALITY SERPL CALC.SUM OF ELEC: 272 MOSM/KG (ref 275–295)
POTASSIUM SERPL-SCNC: 4.3 MMOL/L (ref 3.5–5.1)
SODIUM SERPL-SCNC: 131 MMOL/L (ref 136–145)

## 2021-08-02 PROCEDURE — 99232 SBSQ HOSP IP/OBS MODERATE 35: CPT | Performed by: HOSPITALIST

## 2021-08-02 NOTE — PHYSICAL THERAPY NOTE
PHYSICAL THERAPY TREATMENT NOTE - INPATIENT    Room Number: 0538/1409-Q     Session: 3  Number of Visits to Meet Established Goals: 5    Presenting Problem: Painless jaundice, hyponatremia, near syncope and anemia  History related to current admission: Pt adjusting bedclothes, sheets and blankets)?: A Little   -   Sitting down on and standing up from a chair with arms (e.g., wheelchair, bedside commode, etc.): A Little   -   Moving from lying on back to sitting on the side of the bed?: A Lot   How much help Patient End of Session: Up in chair;Needs met;Call light within reach;RN aware of session/findings; All patient questions and concerns addressed; Alarm set; Family present (alarm pad placed, PCT Apolonia notified of missing green box.)    ASSESSMENT   Pt s

## 2021-08-02 NOTE — PROGRESS NOTES
[facility]  Progress Note     Wade Brochure Patient Status:  Inpatient    1933 MRN [id number]   [facility] 3NE-A Attending Jayson Reid MD   Hosp Day # 7 PCP PHYSICIAN NONSTAFF     Chief Complaint: jaundice    S: [place] Pro-Calcitonin  No results for input(s): PCT in the last 168 hours. Cardiac  No results for input(s): TROP, PBNP in the last 168 hours. Creatinine Kinase  No results for input(s): CK in the last 168 hours.     Inflammatory Markers  No results fo

## 2021-08-02 NOTE — CM/SW NOTE
MSW called [facility] and spoke with Lety. Additional information such as H and P and updated therapy notes faxed to [phone]. MSW informed PT that OT will likely also be needed.     Raj BURNETTE

## 2021-08-02 NOTE — PAYOR COMM NOTE
--------------  CONTINUED STAY REVIEW---REQUESTING ADDITIONAL DAYS 7/31, 8/1 AND 8/2      Payor: [address] [place] [address] #:  [id number]  Authorization Number: [id number]    Admit date: 7/26/21  Admit time: 12:31 AM    Admitting Physician: Christopher Navas,       Estimated Creatinine Clearance: 74.9 mL/min (A) (based on SCr of 0.38 mg/dL (L)).     Recent Labs   Lab 07/27/21  0539   PTP 13.9   INR 1.04            COVID-19 Lab Results     COVID-19        Lab Results   Component Value Date     COVID19 Not [employer]:   6 point ROS completed and was negative, except for pertinent positive and negatives stated in subjective.     Vital signs:  Temp:  [97.7 °F (36.5 °C)-98.3 °F (36.8 °C)] 97.8 °F (36.6 °C)  Pulse:  [74-92] 83  Resp:  [14-22] 16  BP: (108-175)/(59- input(s): CK in the last 168 hours.     Inflammatory Markers      Recent Labs   Lab 07/26/21  0621   GENOVEVA 294.1         Imaging: Imaging data reviewed in Epic.     Medications:   • docusate sodium  100 mg Oral BID   • nitrofurantoin  50 mg Oral Nightly   • distress. +jaundiced  Respiratory: Clear to auscultation bilaterally. No wheezes. No rhonchi. Cardiovascular: S1, S2. Regular rate and rhythm. No murmurs, rubs or gallops. Abdomen: Soft, nontender, nondistended. No rebound or guarding.   Neurologic: No Daily         ASSESSMENT / PLAN:      18. Painless jaundice  1. Unclear etiology  2. LFTs stable   3. ERCP without biliary obstruction  1. Did reveal thickened gallbladder - surgery following   19. Gallbladder distention  1.  Plan for cholecystectomy as OP 24 hr tab 60 mg     Date Action Dose Route User    8/2/2021 0903 Given 60 mg Oral Jay Pozo, RN      losartan Potassium (COZAAR) tab 50 mg     Date Action Dose Route User    8/2/2021 0902 Given 50 mg Oral Marifer Mayes RN    8/1/2021 2

## 2021-08-02 NOTE — CM/SW NOTE
OT note and PLOF faxed to [address] [facility] at [phone]. Awaiting response.       YOMAIRA RuelasW

## 2021-08-02 NOTE — PLAN OF CARE
Assumed care at [address] at bedside to assist with translation  Pt denies abdominal pain or nausea  Tolerating low fiber diet. Fair appetite  Up to chair with max assistance  Hypotensive this afternoon; pt symptomatic. Hospitalist notified.  Improvement

## 2021-08-02 NOTE — DIETARY NOTE
[facility]    NUTRITION ASSESSMENT    Pt  malnutrition criteria.     CRITERIA FOR MALNUTRITION DIAGNOSIS:  Criteria for non-severe malnutrition diagnosis: chronic illness related to wt loss 10% in 6 months, energy intake less than75% for greater than 1 ml; Is Patient on Accuchecks?  Yes     Oral Supplements: order if needed when diet advances    Percent Meals Eaten (last 3 days)     Date/Time Percent Meals Eaten (%)    07/30/21 0830  100 %    07/30/21 1157  90 %    07/31/21 0855  40 %    08/01/21 0930  30

## 2021-08-02 NOTE — PLAN OF CARE
Assumed care at 1930  A&Ox4, RA, NSR on tele  Hot packs given for knee discomfort  Needs met will continue to monitor        Problem: Patient/Family Goals  Goal: Patient/Family Long Term Goal  Description: Patient's Long Term Goal: discharge to rehab    In

## 2021-08-02 NOTE — OCCUPATIONAL THERAPY NOTE
OCCUPATIONAL THERAPY TREATMENT NOTE - INPATIENT     Room Number: 5737/8372-R  Session: 2   Number of Visits to Meet Established Goals: 5    Presenting Problem: jaundice    History related to current admission: UPDATES 7/29-->patient to have cholecystectomy [facility]   • [facility] [facility] CATARACT EXTRACAP,INSERT LENS  1/19/2011    Performed by Nayeli Oviedo at [facility]           OBJECTIVE  Precautions: Bed/chair alarm;  needed (Laurita)    WEIGHT BEARING RESTRICTION  Weight Bearing Restriction: continues to work toward OT goals. LB dressing mod A, toileting mod A, toilet transfer min A, UB ADL CGA/set-up level. Current limitations: hip pain, B knee pain, decreased standing balance, and decreased activity endurance.  Continue to recommend [facility]INO whe

## 2021-08-03 LAB
ALBUMIN SERPL-MCNC: 1.7 G/DL (ref 3.4–5)
ALBUMIN/GLOB SERPL: 0.4 {RATIO} (ref 1–2)
ALP LIVER SERPL-CCNC: 911 U/L
ALT SERPL-CCNC: 99 U/L
ANION GAP SERPL CALC-SCNC: 5 MMOL/L (ref 0–18)
AST SERPL-CCNC: 177 U/L (ref 15–37)
BILIRUB SERPL-MCNC: 6.9 MG/DL (ref 0.1–2)
BUN BLD-MCNC: 13 MG/DL (ref 7–18)
CALCIUM BLD-MCNC: 8.3 MG/DL (ref 8.5–10.1)
CHLORIDE SERPL-SCNC: 94 MMOL/L (ref 98–112)
CO2 SERPL-SCNC: 31 MMOL/L (ref 21–32)
CREAT BLD-MCNC: 0.51 MG/DL
GLOBULIN PLAS-MCNC: 3.9 G/DL (ref 2.8–4.4)
GLUCOSE BLD-MCNC: 114 MG/DL (ref 70–99)
GLUCOSE BLD-MCNC: 132 MG/DL (ref 70–99)
GLUCOSE BLD-MCNC: 171 MG/DL (ref 70–99)
GLUCOSE BLD-MCNC: 197 MG/DL (ref 70–99)
GLUCOSE BLD-MCNC: 201 MG/DL (ref 70–99)
M PROTEIN MFR SERPL ELPH: 5.6 G/DL (ref 6.4–8.2)
OSMOLALITY SERPL CALC.SUM OF ELEC: 274 MOSM/KG (ref 275–295)
POTASSIUM SERPL-SCNC: 4.1 MMOL/L (ref 3.5–5.1)
SODIUM SERPL-SCNC: 130 MMOL/L (ref 136–145)

## 2021-08-03 PROCEDURE — 99232 SBSQ HOSP IP/OBS MODERATE 35: CPT | Performed by: HOSPITALIST

## 2021-08-03 RX ORDER — DIPHENHYDRAMINE HCL 25 MG
25 CAPSULE ORAL EVERY 6 HOURS PRN
Status: DISCONTINUED | OUTPATIENT
Start: 2021-08-03 | End: 2021-08-04

## 2021-08-03 NOTE — PLAN OF CARE
Assumed care at 1930  Knee pain managed with tramadol  Family at bedside  Needs met will continue to monitor

## 2021-08-03 NOTE — PROGRESS NOTES
[facility]  Progress Note     Mari Cheek Patient Status:  Inpatient    1933 MRN [id number]   [facility] 3NE-A Attending Brigitte Grover MD   Hosp Day # 8 PCP PHYSICIAN NONSTAFF     Chief Complaint: jaundice    S: Demian Ades last 168 hours. Cardiac  No results for input(s): TROP, PBNP in the last 168 hours. Creatinine Kinase  No results for input(s): CK in the last 168 hours. Inflammatory Markers  No results for input(s): CRP, GENOVEVA, LDH, DDIMER in the last 168 hours.

## 2021-08-03 NOTE — CM/SW NOTE
Marlo De received for pt to go to [facility]. Auth #[id number] good from 8/3-8/11. Pt not ready for dc today as pt now may have surgery inpt. Chrissy aware that we have insurance auth.

## 2021-08-04 VITALS
HEART RATE: 83 BPM | RESPIRATION RATE: 16 BRPM | HEIGHT: 60 IN | BODY MASS INDEX: 25.02 KG/M2 | SYSTOLIC BLOOD PRESSURE: 153 MMHG | TEMPERATURE: 98 F | DIASTOLIC BLOOD PRESSURE: 51 MMHG | OXYGEN SATURATION: 99 % | WEIGHT: 127.44 LBS

## 2021-08-04 LAB
ALBUMIN SERPL-MCNC: 1.5 G/DL (ref 3.4–5)
ALBUMIN/GLOB SERPL: 0.4 {RATIO} (ref 1–2)
ALP LIVER SERPL-CCNC: 880 U/L
ALT SERPL-CCNC: 88 U/L
ANION GAP SERPL CALC-SCNC: 3 MMOL/L (ref 0–18)
AST SERPL-CCNC: 160 U/L (ref 15–37)
BILIRUB SERPL-MCNC: 6.4 MG/DL (ref 0.1–2)
BUN BLD-MCNC: 15 MG/DL (ref 7–18)
CALCIUM BLD-MCNC: 8.1 MG/DL (ref 8.5–10.1)
CHLORIDE SERPL-SCNC: 97 MMOL/L (ref 98–112)
CO2 SERPL-SCNC: 29 MMOL/L (ref 21–32)
CREAT BLD-MCNC: 0.55 MG/DL
GLOBULIN PLAS-MCNC: 3.8 G/DL (ref 2.8–4.4)
GLUCOSE BLD-MCNC: 122 MG/DL (ref 70–99)
GLUCOSE BLD-MCNC: 132 MG/DL (ref 70–99)
GLUCOSE BLD-MCNC: 173 MG/DL (ref 70–99)
M PROTEIN MFR SERPL ELPH: 5.3 G/DL (ref 6.4–8.2)
OSMOLALITY SERPL CALC.SUM OF ELEC: 270 MOSM/KG (ref 275–295)
POTASSIUM SERPL-SCNC: 3.8 MMOL/L (ref 3.5–5.1)
SODIUM SERPL-SCNC: 129 MMOL/L (ref 136–145)

## 2021-08-04 PROCEDURE — 99239 HOSP IP/OBS DSCHRG MGMT >30: CPT | Performed by: HOSPITALIST

## 2021-08-04 RX ORDER — POTASSIUM CHLORIDE 20 MEQ/1
40 TABLET, EXTENDED RELEASE ORAL ONCE
Status: COMPLETED | OUTPATIENT
Start: 2021-08-04 | End: 2021-08-04

## 2021-08-04 RX ORDER — ALPRAZOLAM 0.25 MG/1
0.25 TABLET ORAL 2 TIMES DAILY PRN
Qty: 10 TABLET | Refills: 0 | Status: SHIPPED | OUTPATIENT
Start: 2021-08-04

## 2021-08-04 NOTE — PLAN OF CARE
Assumed care @ [address] with [facility] and Dr. Cristo Hernandez via phone regarding plan for surgery  +BM.  Tolerating diet  Up to the chair for lunch  Son at bedside to provide translation

## 2021-08-04 NOTE — CONSULTS
Patient seen and examined. Discussed with Son. Getting discharged today. Will plan for cholecystectomy as outpatient.  Full consult to follow

## 2021-08-04 NOTE — DISCHARGE SUMMARY
[facility] HOSPITALIST  DISCHARGE SUMMARY     Danielito Rajan Patient Status:  Inpatient    1933 MRN [id number]   [facility] 7NE-A Attending No att. providers found   Hosp Day # 9 PCP PHYSICIAN NONSTAFF     Date of Admission: 2021 Tabs  Commonly known as: ULTRAM      Take 1 tablet (50 mg total) by mouth every 6 (six) hours as needed for Pain.    Quantity: 15 tablet  Refills: 0        CHANGE how you take these medications      Instructions Prescription details   ALPRAZolam 0.25 MG Tab BRAYDON [address]  [phone]    In 1 week  follow up to plan gallgladder surgery; call for appointment    Nonstaff, Physician  [address]    Schedule an appointment as soon as possible for a visit      Appointments for

## 2021-08-04 NOTE — CM/SW NOTE
It has been decided that pt will have her gallbladder surgery outpt so pt will not need to stay in the hospital.  She has been medically cleared for dc. Pt will go to [facility] for [facility]. Imelda Love at [place] to coordinate dc time.   RN to call gail t

## 2021-08-04 NOTE — PLAN OF CARE
NURSING DISCHARGE NOTE    Discharged Nursing home via Ambulance. Accompanied by Support staff  Belongings Taken by patient/family. Report given to [address] [place] [address] at [place]. Copy of signed scripts sent as well as AVS.  Reviewed AVS with son as well.

## 2021-08-04 NOTE — CONSULTS
[facility]  Report of Consultation    Danielito Cannons Patient Status:  Inpatient    1933 MRN [id number]   [facility] 7NE-A Attending Qasim Contreras MD   [facility] Day # 9 PCP PHYSICIAN NONSTAFF     21    Reason for Consultation She has never used smokeless tobacco. She reports that she does not drink alcohol and does not use drugs.     Allergies:    No Known Allergies    Current Medications:      Current Facility-Administered Medications:   •  diphenhydrAMINE (BENADRYL) cap/tab 25 mg by mouth 2 (two) times daily. , Disp: , Rfl:   losartan Potassium 50 MG Oral Tab, Take 50 mg by mouth 2 (two) times daily. , Disp: , Rfl:   CARVEDILOL 25 MG OR TABS, Take 25 mg by mouth 2 (two) times daily with meals.   , Disp: , Rfl:   SB LOW DOSE ASA E CONTRAST, NO ORAL (ER), 7/25/2021, 11:07 PM.  INDICATIONS:  Painless jaundice, suspicion for gallbladder mass. TECHNIQUE:  Real time gray-scale ultrasound was used to evaluate the abdomen.   The exam includes images of the liver, gallbladder, common bile d shot fast spin echo, chemical shift imaging, breath hold T2 weighted images and 2-D fiesta sequences were acquired. No contrast material was administered. Fluid sensitive imaging with MRCP.  Reconstruction was also performed including 3D multi-projection im irregular wall with mild pericholecystic fluid/inflammatory change. The appearance is suspicious for potential cholecystitis. 2. No biliary ductal dilatation or filling defects along the biliary tree noted.  3. Atrophic changes of the pancreas and atrophic There is a nodular contour to the liver suggesting possible cirrhosis. There is borderline hepatomegaly.   There is some nonspecific heterogeneous enhancement of the dome of the left lobe of the liver thought to be transient hepatic attenuation differences pulmonary fibrosis in the visualized lung bases. Marked calcification of the mitral valve annulus. Marked coronary artery calcification. Mild calcification of the aortic valve leaflets. OTHER:  Negative. CONCLUSION:  1.  The gallbladder wall

## 2021-08-04 NOTE — PROGRESS NOTES
Seen and examined; spoke with dr. Angelica Mosquera, and he assured me that patient can have outpatient surgery with dr. Michael Crowell.

## 2021-08-04 NOTE — CM/SW NOTE
08/04/21 1107   Discharge disposition   Expected discharge disposition Skilled Nurs   [facility] Provider Preet Figueroa   Discharge transportation [facility]

## 2021-08-05 ENCOUNTER — EXTERNAL FACILITY (OUTPATIENT)
Dept: FAMILY MEDICINE CLINIC | Facility: CLINIC | Age: 86
End: 2021-08-05

## 2021-08-05 DIAGNOSIS — E11.9 TYPE 2 DIABETES MELLITUS WITHOUT COMPLICATION, WITHOUT LONG-TERM CURRENT USE OF INSULIN (HCC): ICD-10-CM

## 2021-08-05 DIAGNOSIS — R55 SYNCOPE, NEAR: Primary | ICD-10-CM

## 2021-08-05 DIAGNOSIS — R53.81 PHYSICAL DECONDITIONING: ICD-10-CM

## 2021-08-05 DIAGNOSIS — Z91.81 AT HIGH RISK FOR FALLS: ICD-10-CM

## 2021-08-05 DIAGNOSIS — R79.89 AZOTEMIA: ICD-10-CM

## 2021-08-05 DIAGNOSIS — I10 ESSENTIAL HYPERTENSION: ICD-10-CM

## 2021-08-05 DIAGNOSIS — R17 PAINLESS JAUNDICE: ICD-10-CM

## 2021-08-05 DIAGNOSIS — D64.9 ANEMIA, UNSPECIFIED TYPE: ICD-10-CM

## 2021-08-05 DIAGNOSIS — R11.2 NAUSEA AND VOMITING IN ADULT: ICD-10-CM

## 2021-08-05 DIAGNOSIS — E87.1 HYPONATREMIA: ICD-10-CM

## 2021-08-05 DIAGNOSIS — R53.1 GENERALIZED WEAKNESS: ICD-10-CM

## 2021-08-05 PROCEDURE — 1111F DSCHRG MED/CURRENT MED MERGE: CPT | Performed by: FAMILY MEDICINE

## 2021-08-05 PROCEDURE — 99306 1ST NF CARE HIGH MDM 50: CPT | Performed by: FAMILY MEDICINE

## 2021-08-20 ENCOUNTER — EXTERNAL FACILITY (OUTPATIENT)
Dept: FAMILY MEDICINE CLINIC | Facility: CLINIC | Age: 86
End: 2021-08-20

## 2021-08-20 DIAGNOSIS — R55 SYNCOPE, NEAR: ICD-10-CM

## 2021-08-20 DIAGNOSIS — I10 ESSENTIAL HYPERTENSION: ICD-10-CM

## 2021-08-20 DIAGNOSIS — R11.2 NAUSEA AND VOMITING IN ADULT: ICD-10-CM

## 2021-08-20 DIAGNOSIS — R79.89 AZOTEMIA: ICD-10-CM

## 2021-08-20 DIAGNOSIS — E11.9 TYPE 2 DIABETES MELLITUS WITHOUT COMPLICATION, WITHOUT LONG-TERM CURRENT USE OF INSULIN (HCC): ICD-10-CM

## 2021-08-20 DIAGNOSIS — E87.1 HYPONATREMIA: ICD-10-CM

## 2021-08-20 DIAGNOSIS — R17 PAINLESS JAUNDICE: Primary | ICD-10-CM

## 2021-08-20 DIAGNOSIS — R53.1 GENERALIZED WEAKNESS: ICD-10-CM

## 2021-08-20 DIAGNOSIS — R53.81 PHYSICAL DECONDITIONING: ICD-10-CM

## 2021-08-20 PROCEDURE — 99309 SBSQ NF CARE MODERATE MDM 30: CPT | Performed by: FAMILY MEDICINE

## 2021-08-26 ENCOUNTER — HOSPITAL ENCOUNTER (OUTPATIENT)
Dept: ULTRASOUND IMAGING | Facility: HOSPITAL | Age: 86
Discharge: HOME | End: 2021-08-26
Attending: INTERNAL MEDICINE
Payer: MEDICARE

## 2021-08-26 ENCOUNTER — HOSPITAL ENCOUNTER (EMERGENCY)
Facility: HOSPITAL | Age: 86
Discharge: HOME | End: 2021-08-26
Attending: EMERGENCY MEDICINE
Payer: MEDICARE

## 2021-08-26 ENCOUNTER — APPOINTMENT (OUTPATIENT)
Dept: GENERAL RADIOLOGY | Facility: HOSPITAL | Age: 86
End: 2021-08-26
Attending: EMERGENCY MEDICINE
Payer: MEDICARE

## 2021-08-26 VITALS
DIASTOLIC BLOOD PRESSURE: 65 MMHG | HEART RATE: 77 BPM | RESPIRATION RATE: 21 BRPM | OXYGEN SATURATION: 100 % | TEMPERATURE: 97 F | SYSTOLIC BLOOD PRESSURE: 115 MMHG

## 2021-08-26 DIAGNOSIS — R17 UNSPECIFIED JAUNDICE: ICD-10-CM

## 2021-08-26 DIAGNOSIS — E87.1 HYPONATREMIA: Primary | ICD-10-CM

## 2021-08-26 DIAGNOSIS — D64.9 ANEMIA, UNSPECIFIED TYPE: ICD-10-CM

## 2021-08-26 DIAGNOSIS — R55 SYNCOPE AND COLLAPSE: ICD-10-CM

## 2021-08-26 LAB
ALBUMIN SERPL-MCNC: 1.9 G/DL (ref 3.4–5)
ALBUMIN/GLOB SERPL: 0.5 {RATIO} (ref 1–2)
ALP LIVER SERPL-CCNC: 689 U/L
ALT SERPL-CCNC: 75 U/L
ANION GAP SERPL CALC-SCNC: 5 MMOL/L (ref 0–18)
ANTIBODY SCREEN: NEGATIVE
AST SERPL-CCNC: 133 U/L (ref 15–37)
ATRIAL RATE: 78 BPM
BASE EXCESS BLDV CALC-SCNC: 1.1 MMOL/L
BASOPHILS # BLD AUTO: 0.02 X10(3) UL (ref 0–0.2)
BASOPHILS NFR BLD AUTO: 0.4 %
BILIRUB SERPL-MCNC: 4.9 MG/DL (ref 0.1–2)
BUN BLD-MCNC: 10 MG/DL (ref 7–18)
CALCIUM BLD-MCNC: 8.2 MG/DL (ref 8.5–10.1)
CHLORIDE SERPL-SCNC: 98 MMOL/L (ref 98–112)
CO2 SERPL-SCNC: 25 MMOL/L (ref 21–32)
CREAT BLD-MCNC: 0.54 MG/DL
EOSINOPHIL # BLD AUTO: 0.26 X10(3) UL (ref 0–0.7)
EOSINOPHIL NFR BLD AUTO: 4.9 %
ERYTHROCYTE [DISTWIDTH] IN BLOOD BY AUTOMATED COUNT: 14.1 %
GLOBULIN PLAS-MCNC: 4.2 G/DL (ref 2.8–4.4)
GLUCOSE BLD-MCNC: 155 MG/DL (ref 70–99)
GLUCOSE BLD-MCNC: 194 MG/DL (ref 70–99)
HCO3 BLDV-SCNC: 25.1 MEQ/L (ref 23–27)
HCT VFR BLD AUTO: 30.8 %
HGB BLD-MCNC: 10.4 G/DL
IMM GRANULOCYTES # BLD AUTO: 0.04 X10(3) UL (ref 0–1)
IMM GRANULOCYTES NFR BLD: 0.8 %
LACTATE SERPL-SCNC: 1.9 MMOL/L (ref 0.4–2)
LIPASE SERPL-CCNC: 99 U/L (ref 73–393)
LYMPHOCYTES # BLD AUTO: 1 X10(3) UL (ref 1–4)
LYMPHOCYTES NFR BLD AUTO: 18.8 %
M PROTEIN MFR SERPL ELPH: 6.1 G/DL (ref 6.4–8.2)
MCH RBC QN AUTO: 32.2 PG (ref 26–34)
MCHC RBC AUTO-ENTMCNC: 33.8 G/DL (ref 31–37)
MCV RBC AUTO: 95.4 FL
MONOCYTES # BLD AUTO: 0.38 X10(3) UL (ref 0.1–1)
MONOCYTES NFR BLD AUTO: 7.1 %
NEUTROPHILS # BLD AUTO: 3.63 X10 (3) UL (ref 1.5–7.7)
NEUTROPHILS # BLD AUTO: 3.63 X10(3) UL (ref 1.5–7.7)
NEUTROPHILS NFR BLD AUTO: 68 %
OSMOLALITY SERPL CALC.SUM OF ELEC: 268 MOSM/KG (ref 275–295)
P AXIS: 34 DEGREES
P-R INTERVAL: 116 MS
PCO2 BLDV: 38 MM HG (ref 38–50)
PH BLDV: 7.44 [PH] (ref 7.33–7.43)
PLATELET # BLD AUTO: 144 10(3)UL (ref 150–450)
PO2 BLDV: 30 MM HG (ref 30–50)
POTASSIUM SERPL-SCNC: 4.1 MMOL/L (ref 3.5–5.1)
PROCALCITONIN SERPL-MCNC: 0.1 NG/ML (ref ?–0.16)
Q-T INTERVAL: 412 MS
QRS DURATION: 108 MS
QTC CALCULATION (BEZET): 469 MS
R AXIS: -39 DEGREES
RBC # BLD AUTO: 3.23 X10(6)UL
RH BLOOD TYPE: POSITIVE
SAO2 % BLDV: 56 % (ref 72–78)
SAO2 % BLDV: 61 % (ref 72–78)
SARS-COV-2 RNA RESP QL NAA+PROBE: NOT DETECTED
SODIUM SERPL-SCNC: 128 MMOL/L (ref 136–145)
T AXIS: 38 DEGREES
VENTRICULAR RATE: 78 BPM
WBC # BLD AUTO: 5.3 X10(3) UL (ref 4–11)

## 2021-08-26 PROCEDURE — 76705 ECHO EXAM OF ABDOMEN: CPT | Performed by: INTERNAL MEDICINE

## 2021-08-26 PROCEDURE — 82803 BLOOD GASES ANY COMBINATION: CPT | Performed by: EMERGENCY MEDICINE

## 2021-08-26 PROCEDURE — 82962 GLUCOSE BLOOD TEST: CPT

## 2021-08-26 PROCEDURE — 84145 PROCALCITONIN (PCT): CPT | Performed by: EMERGENCY MEDICINE

## 2021-08-26 PROCEDURE — 83605 ASSAY OF LACTIC ACID: CPT | Performed by: EMERGENCY MEDICINE

## 2021-08-26 PROCEDURE — 99284 EMERGENCY DEPT VISIT MOD MDM: CPT

## 2021-08-26 PROCEDURE — 86850 RBC ANTIBODY SCREEN: CPT | Performed by: EMERGENCY MEDICINE

## 2021-08-26 PROCEDURE — 86900 BLOOD TYPING SEROLOGIC ABO: CPT | Performed by: EMERGENCY MEDICINE

## 2021-08-26 PROCEDURE — 85025 COMPLETE CBC W/AUTO DIFF WBC: CPT | Performed by: EMERGENCY MEDICINE

## 2021-08-26 PROCEDURE — 87040 BLOOD CULTURE FOR BACTERIA: CPT | Performed by: EMERGENCY MEDICINE

## 2021-08-26 PROCEDURE — 93005 ELECTROCARDIOGRAM TRACING: CPT

## 2021-08-26 PROCEDURE — 93010 ELECTROCARDIOGRAM REPORT: CPT

## 2021-08-26 PROCEDURE — 86901 BLOOD TYPING SEROLOGIC RH(D): CPT | Performed by: EMERGENCY MEDICINE

## 2021-08-26 PROCEDURE — 36415 COLL VENOUS BLD VENIPUNCTURE: CPT

## 2021-08-26 PROCEDURE — 71045 X-RAY EXAM CHEST 1 VIEW: CPT | Performed by: EMERGENCY MEDICINE

## 2021-08-26 PROCEDURE — 83690 ASSAY OF LIPASE: CPT | Performed by: EMERGENCY MEDICINE

## 2021-08-26 PROCEDURE — 80053 COMPREHEN METABOLIC PANEL: CPT | Performed by: EMERGENCY MEDICINE

## 2021-08-26 NOTE — ED INITIAL ASSESSMENT (HPI)
Pt in with family. Pt was coming in for US and has been fasting past 6 hours. Pt was unresponsive in triage, brought back to B3 and eyes open.  Speaks Laurita   Pale   Paperwork given from [facility] [address] [facility]

## 2021-08-26 NOTE — ED PROVIDER NOTES
Patient Seen in: [facility] Emergency Department      History   Patient presents with:  Altered Mental Status    Stated Complaint: unresponsive out front in triage     HPI/Subjective:   HPI    This is an 66-year-old female presents with syncope with 3/2/2011    Performed by Kayla Liu at [address]    • [address]  1/19/2011    Performed by Kayla Liu at [address] History    Tobacco Use normal limits   VENOUS BLOOD GAS - Abnormal; Notable for the following components:    Venous pH 7.44 (*)     Venous O2 Saturation 56 (*)     Venous O2 Sat.  Calc. 61 (*)     All other components within normal limits   POCT GLUCOSE - Abnormal; Notable for th BLOOD CULTURE     EKG    Rate, intervals and axes as noted on EKG Report. Rate: 78  Rhythm: Sinus Rhythm  Reading: No acute changes. MDM      Accu-Chek 194.     VBG pH 7.44/PCO2 38/PO2 30/bicarb 25      Patient placed on cardiac monitor,

## 2021-08-26 NOTE — CM/SW NOTE
LM for SHANNA Gonzalez @ [facility] to call me regarding patient, [phone].      4:20PM - spoke with Evelin He RN @ [place], [place] to send patient back. This is patient's baseline.

## 2021-08-31 ENCOUNTER — TELEPHONE (OUTPATIENT)
Dept: PREADMISSION TESTING | Age: 86
End: 2021-08-31

## 2021-08-31 PROBLEM — Z91.81 AT HIGH RISK FOR FALLS: Status: ACTIVE | Noted: 2021-08-31

## 2021-08-31 PROBLEM — R53.1 GENERALIZED WEAKNESS: Status: ACTIVE | Noted: 2021-08-31

## 2021-08-31 PROBLEM — R53.81 PHYSICAL DECONDITIONING: Status: ACTIVE | Noted: 2021-08-31

## 2021-08-31 ASSESSMENT — ACTIVITIES OF DAILY LIVING (ADL)
TOILETING: NEEDS ASSISTANCE
ADL_SCORE: 7
MOBILITY_ASSIST_DEVICES: WHEELCHAIR
BATHING: NEEDS ASSISTANCE
TRANSFERRING: NEEDS ASSISTANCE
ADL_BEFORE_ADMISSION: NEEDS/REQUIRES ASSISTANCE
CONTINENCE: NEEDS ASSISTANCE
FEEDING: INDEPENDENT
DRESSING: NEEDS ASSISTANCE
SENSORY_SUPPORT_DEVICES: EYEGLASSES;HEARING AIDS

## 2021-09-01 VITALS — BODY MASS INDEX: 24.74 KG/M2 | HEIGHT: 60 IN | WEIGHT: 126 LBS

## 2021-09-01 RX ORDER — LIDOCAINE 50 MG/G
1 PATCH TOPICAL EVERY 24 HOURS
COMMUNITY
End: 2021-12-01

## 2021-09-01 RX ORDER — DIPHENHYDRAMINE HCL 25 MG
25 CAPSULE ORAL EVERY 6 HOURS PRN
COMMUNITY

## 2021-09-01 RX ORDER — POLYETHYLENE GLYCOL 3350 17 G/17G
17 POWDER, FOR SOLUTION ORAL DAILY
COMMUNITY

## 2021-09-01 RX ORDER — CHOLESTYRAMINE 4 G/9G
1 POWDER, FOR SUSPENSION ORAL DAILY
COMMUNITY

## 2021-09-01 RX ORDER — TRAMADOL HYDROCHLORIDE 50 MG/1
50 TABLET ORAL EVERY 6 HOURS PRN
Status: ON HOLD | COMMUNITY
End: 2021-12-07 | Stop reason: HOSPADM

## 2021-09-01 NOTE — PROGRESS NOTES
[address] [facility] Author: Avery Wright MD     1933 MRN [id number]   [facility]  Admission 21      Last Hospital Discharge 21 PCP Bethany Covington MD   Hospital of Discharge 21       Date of Admission: 8 failure (CHF) ([facility] [address] Essential hypertension, Hearing impairment, High blood pressure, Osteoarthritis, Type II or unspecified type diabetes mellitus without mention of complication, not stated as uncontrolled (2010), and Visual impairment.     She  has a pas no distension. Palpations: Abdomen is soft. There is no mass. Tenderness: There is no abdominal tenderness. There is no guarding or rebound. Hernia: No hernia is present. Musculoskeletal:         General: No tenderness.  Normal range of mot dictation time shown below.      Dictated by (CST): Marcelina Wilkerson MD on 8/26/2021 at 2:03 PM     Finalized by (CST): Marcelina Wilkerson MD on 8/26/2021 at 2:06 PM       XR CHEST AP PORTABLE  (CPT=71045)    Result Date: 8/26/2021  PROCEDURE:  XR CHEST AP PORTABLE  (CPT services:  Occupational Therapy  Physical Therapy  Respiratory Therapy    I anticipate that she will need 2 weeks of therapy and recooperation before an eventual transition to home and we will work on her discharge needs.        Lien Villarreal MD 8/31/2021, 1

## 2021-09-02 ENCOUNTER — HOSPITAL ENCOUNTER (OUTPATIENT)
Dept: INTERVENTIONAL RADIOLOGY/VASCULAR | Age: 86
Discharge: HOME | End: 2021-09-02
Attending: INTERNAL MEDICINE

## 2021-09-02 VITALS
OXYGEN SATURATION: 98 % | HEART RATE: 60 BPM | BODY MASS INDEX: 23.29 KG/M2 | RESPIRATION RATE: 18 BRPM | HEIGHT: 60 IN | WEIGHT: 118.61 LBS | SYSTOLIC BLOOD PRESSURE: 127 MMHG | TEMPERATURE: 97 F | DIASTOLIC BLOOD PRESSURE: 65 MMHG

## 2021-09-02 DIAGNOSIS — R17 UNSPECIFIED JAUNDICE: ICD-10-CM

## 2021-09-02 LAB
ANION GAP SERPL CALC-SCNC: 7 MMOL/L (ref 10–20)
BASOPHILS # BLD: 0 K/MCL (ref 0–0.3)
BASOPHILS NFR BLD: 0 %
BUN SERPL-MCNC: 10 MG/DL (ref 6–20)
BUN/CREAT SERPL: 19 (ref 7–25)
CALCIUM SERPL-MCNC: 8.3 MG/DL (ref 8.4–10.2)
CHLORIDE SERPL-SCNC: 99 MMOL/L (ref 98–107)
CO2 SERPL-SCNC: 28 MMOL/L (ref 21–32)
CREAT SERPL-MCNC: 0.54 MG/DL (ref 0.51–0.95)
DEPRECATED RDW RBC: 48.4 FL (ref 39–50)
EOSINOPHIL # BLD: 0.2 K/MCL (ref 0–0.5)
EOSINOPHIL NFR BLD: 3 %
ERYTHROCYTE [DISTWIDTH] IN BLOOD: 13.8 % (ref 11–15)
FASTING DURATION TIME PATIENT: ABNORMAL H
GFR SERPLBLD BASED ON 1.73 SQ M-ARVRAT: 85 ML/MIN
GLUCOSE BLDC GLUCOMTR-MCNC: 115 MG/DL (ref 70–99)
GLUCOSE SERPL-MCNC: 116 MG/DL (ref 65–99)
HCT VFR BLD CALC: 31.4 % (ref 36–46.5)
HGB BLD-MCNC: 10.3 G/DL (ref 12–15.5)
IMM GRANULOCYTES # BLD AUTO: 0 K/MCL (ref 0–0.2)
IMM GRANULOCYTES # BLD: 1 %
INR PPP: 1.2
LYMPHOCYTES # BLD: 1.1 K/MCL (ref 1–4)
LYMPHOCYTES NFR BLD: 15 %
MCH RBC QN AUTO: 31.5 PG (ref 26–34)
MCHC RBC AUTO-ENTMCNC: 32.8 G/DL (ref 32–36.5)
MCV RBC AUTO: 96 FL (ref 78–100)
MONOCYTES # BLD: 0.6 K/MCL (ref 0.3–0.9)
MONOCYTES NFR BLD: 9 %
NEUTROPHILS # BLD: 5.1 K/MCL (ref 1.8–7.7)
NEUTROPHILS NFR BLD: 72 %
NRBC BLD MANUAL-RTO: 0 /100 WBC
PLATELET # BLD AUTO: 145 K/MCL (ref 140–450)
POTASSIUM SERPL-SCNC: 3.8 MMOL/L (ref 3.4–5.1)
PROTHROMBIN TIME: 12.5 SEC (ref 9.7–11.8)
RBC # BLD: 3.27 MIL/MCL (ref 4–5.2)
SARS-COV-2 RNA RESP QL NAA+PROBE: NOT DETECTED
SERVICE CMNT-IMP: NORMAL
SERVICE CMNT-IMP: NORMAL
SODIUM SERPL-SCNC: 130 MMOL/L (ref 135–145)
WBC # BLD: 7 K/MCL (ref 4.2–11)

## 2021-09-02 PROCEDURE — 10006023 HB SUPPLY 272

## 2021-09-02 PROCEDURE — 76942 ECHO GUIDE FOR BIOPSY: CPT

## 2021-09-02 PROCEDURE — 88313 SPECIAL STAINS GROUP 2: CPT

## 2021-09-02 PROCEDURE — 10002801 HB RX 250 W/O HCPCS: Performed by: RADIOLOGY

## 2021-09-02 PROCEDURE — 85610 PROTHROMBIN TIME: CPT | Performed by: RADIOLOGY

## 2021-09-02 PROCEDURE — 10002800 HB RX 250 W HCPCS: Performed by: RADIOLOGY

## 2021-09-02 PROCEDURE — 80048 BASIC METABOLIC PNL TOTAL CA: CPT | Performed by: RADIOLOGY

## 2021-09-02 PROCEDURE — 13000001 HB PHASE II RECOVERY EA 30 MINUTES

## 2021-09-02 PROCEDURE — 87635 SARS-COV-2 COVID-19 AMP PRB: CPT | Performed by: RADIOLOGY

## 2021-09-02 PROCEDURE — 82962 GLUCOSE BLOOD TEST: CPT

## 2021-09-02 PROCEDURE — 85025 COMPLETE CBC W/AUTO DIFF WBC: CPT | Performed by: RADIOLOGY

## 2021-09-02 RX ORDER — SODIUM CHLORIDE 9 MG/ML
INJECTION, SOLUTION INTRAVENOUS CONTINUOUS
Status: DISCONTINUED | OUTPATIENT
Start: 2021-09-02 | End: 2021-09-04 | Stop reason: HOSPADM

## 2021-09-02 RX ORDER — 0.9 % SODIUM CHLORIDE 0.9 %
2 VIAL (ML) INJECTION EVERY 12 HOURS SCHEDULED
Status: DISCONTINUED | OUTPATIENT
Start: 2021-09-02 | End: 2021-09-04 | Stop reason: HOSPADM

## 2021-09-02 RX ORDER — MIDAZOLAM HYDROCHLORIDE 1 MG/ML
INJECTION, SOLUTION INTRAMUSCULAR; INTRAVENOUS PRN
Status: COMPLETED | OUTPATIENT
Start: 2021-09-02 | End: 2021-09-02

## 2021-09-02 RX ORDER — LIDOCAINE HYDROCHLORIDE 10 MG/ML
INJECTION, SOLUTION INFILTRATION; PERINEURAL PRN
Status: COMPLETED | OUTPATIENT
Start: 2021-09-02 | End: 2021-09-02

## 2021-09-02 RX ADMIN — FENTANYL CITRATE 50 MCG: 50 INJECTION, SOLUTION INTRAMUSCULAR; INTRAVENOUS at 09:10

## 2021-09-02 RX ADMIN — MIDAZOLAM HYDROCHLORIDE 1 MG: 1 INJECTION, SOLUTION INTRAMUSCULAR; INTRAVENOUS at 09:10

## 2021-09-02 RX ADMIN — LIDOCAINE HYDROCHLORIDE 10 ML: 10 INJECTION, SOLUTION INFILTRATION; PERINEURAL at 09:12

## 2021-09-02 ASSESSMENT — PAIN SCALES - GENERAL
PAINLEVEL_OUTOF10: 0
PAINLEVEL_OUTOF10: 7
PAINLEVEL_OUTOF10: 0

## 2021-09-13 NOTE — PROGRESS NOTES
[facility]   Late Entry was seen on 2021.      Aguilar Fortune Author: Duncan Navarro MD     1933 MRN [id number]   [facility]  Admission 21      Last Hospital Discharge 21 PCP Edyta Quinn MD   [facility] facility-administered medications on file prior to visit.         HISTORY:  She  has a past medical history of Congestive heart failure (CHF) ([facility] [address] Essential hypertension, Hearing impairment, High blood pressure, Osteoarthritis, Type II or unspecified type stridor. No wheezing or rales. Chest:      Chest wall: No tenderness. Abdominal:      General: Bowel sounds are normal. There is no distension. Palpations: Abdomen is soft. There is no mass. Tenderness: There is no abdominal tenderness.  There significant change when compared to most recent abdominal sonogram dated 7/26/2021. This report was communicated by telephone at the dictation time shown below.      Dictated by (CST): Rao Schmidt MD on 8/26/2021 at 2:03 PM     Finalized by (CST): Rao Schmidt -continue with wound care to watch for signs of pressure ulcers  -tight sugar control. HTN control, continue with home medication.   -Gi follow as OP.   -high risk for falls and readmission.    -COVID precautions       Jewelene Older needs then followi

## 2021-09-14 LAB
ASR DISCLAIMER: NORMAL
CASE RPRT: NORMAL
CLINICAL INFO: NORMAL
PATH REPORT.FINAL DX SPEC: NORMAL
PATH REPORT.GROSS SPEC: NORMAL

## 2021-09-16 ENCOUNTER — EXTERNAL FACILITY (OUTPATIENT)
Dept: FAMILY MEDICINE CLINIC | Facility: CLINIC | Age: 86
End: 2021-09-16

## 2021-09-16 DIAGNOSIS — E87.1 HYPONATREMIA: ICD-10-CM

## 2021-09-16 DIAGNOSIS — D64.9 ANEMIA, UNSPECIFIED TYPE: ICD-10-CM

## 2021-09-16 DIAGNOSIS — K75.81 NASH (NONALCOHOLIC STEATOHEPATITIS): ICD-10-CM

## 2021-09-16 DIAGNOSIS — E11.9 TYPE 2 DIABETES MELLITUS WITHOUT COMPLICATION, WITHOUT LONG-TERM CURRENT USE OF INSULIN (HCC): ICD-10-CM

## 2021-09-16 DIAGNOSIS — I10 ESSENTIAL HYPERTENSION: ICD-10-CM

## 2021-09-16 DIAGNOSIS — K81.9 CHOLECYSTITIS: ICD-10-CM

## 2021-09-16 DIAGNOSIS — R11.2 NAUSEA AND VOMITING IN ADULT: ICD-10-CM

## 2021-09-16 DIAGNOSIS — R55 SYNCOPE, NEAR: ICD-10-CM

## 2021-09-16 DIAGNOSIS — R79.89 AZOTEMIA: ICD-10-CM

## 2021-09-16 DIAGNOSIS — R53.81 PHYSICAL DECONDITIONING: ICD-10-CM

## 2021-09-16 DIAGNOSIS — Z91.81 AT HIGH RISK FOR FALLS: ICD-10-CM

## 2021-09-16 DIAGNOSIS — R17 PAINLESS JAUNDICE: Primary | ICD-10-CM

## 2021-09-16 DIAGNOSIS — R53.1 GENERALIZED WEAKNESS: ICD-10-CM

## 2021-09-16 PROCEDURE — 99308 SBSQ NF CARE LOW MDM 20: CPT | Performed by: FAMILY MEDICINE

## 2021-09-20 ENCOUNTER — HOSPITAL ENCOUNTER (OUTPATIENT)
Dept: NUCLEAR MEDICINE | Facility: HOSPITAL | Age: 86
Discharge: HOME | End: 2021-09-20
Attending: INTERNAL MEDICINE
Payer: MEDICARE

## 2021-09-20 ENCOUNTER — HOSPITAL ENCOUNTER (OUTPATIENT)
Dept: ULTRASOUND IMAGING | Facility: HOSPITAL | Age: 86
Discharge: HOME | End: 2021-09-20
Attending: INTERNAL MEDICINE
Payer: MEDICARE

## 2021-09-20 DIAGNOSIS — R17 UNSPECIFIED JAUNDICE: ICD-10-CM

## 2021-09-20 PROCEDURE — 76700 US EXAM ABDOM COMPLETE: CPT | Performed by: INTERNAL MEDICINE

## 2021-09-20 PROCEDURE — 78227 HEPATOBIL SYST IMAGE W/DRUG: CPT | Performed by: INTERNAL MEDICINE

## 2021-09-20 RX ORDER — MORPHINE SULFATE 2 MG/ML
INJECTION, SOLUTION INTRAMUSCULAR; INTRAVENOUS
Status: DISPENSED
Start: 2021-09-20 | End: 2021-09-20

## 2021-09-20 RX ORDER — MORPHINE SULFATE 4 MG/ML
2.2 INJECTION, SOLUTION INTRAMUSCULAR; INTRAVENOUS EVERY 4 HOURS PRN
Status: COMPLETED | OUTPATIENT
Start: 2021-09-20 | End: 2021-09-20

## 2021-09-20 RX ADMIN — MORPHINE SULFATE 2.2 MG: 4 INJECTION, SOLUTION INTRAMUSCULAR; INTRAVENOUS at 09:45:00

## 2021-09-21 PROBLEM — K75.81 NASH (NONALCOHOLIC STEATOHEPATITIS): Status: ACTIVE | Noted: 2021-09-21

## 2021-09-21 NOTE — PROGRESS NOTES
[facility]   Late Entry was seen on 2021.      Dean Champion Author: Rebecca Mckeon MD     1933 MRN [id number]   [facility]  Admission 21      Last Hospital Discharge 21 PCP Apolonia Sheppard MD   [facility] HCl 1000 MG Oral Tab, Take 1,000 mg by mouth 2 (two) times daily. losartan Potassium 50 MG Oral Tab, Take 50 mg by mouth 2 (two) times daily. CARVEDILOL 25 MG OR TABS, Take 25 mg by mouth 2 (two) times daily with meals.     SB LOW DOSE ASA EC 81 MG OR T thyromegaly. Vascular: No JVD. Trachea: No tracheal deviation. Cardiovascular:      Rate and Rhythm: Normal rate and regular rhythm. Heart sounds: Normal heart sounds. No murmur heard. No friction rub. No gallop.     Pulmonary:      Effort significant masses. BILIARY:  Gallbladder is contracted with diffuse nonspecific gallbladder wall thickening measuring 3 mm. No pericholecystic fluid. No definite gallstones appreciated. PANCREAS:  Normal. RIGHT KIDNEY:  Normal in echogenicity.   Stable 2 jaundice  (primary encounter diagnosis)  Generalized weakness  Physical deconditioning  Anemia, unspecified type  At high risk for falls  Azotemia  Essential hypertension  Hyponatremia  Nausea and vomiting in adult  Syncope, near  Type 2 diabetes mellitus

## 2021-09-30 ENCOUNTER — HOSPITAL ENCOUNTER (INPATIENT)
Age: 86
LOS: 1 days | Discharge: NURSING, REHAB OR LONG-TERM CARE | DRG: 442 | End: 2021-10-01
Attending: EMERGENCY MEDICINE | Admitting: INTERNAL MEDICINE

## 2021-09-30 ENCOUNTER — APPOINTMENT (OUTPATIENT)
Dept: ULTRASOUND IMAGING | Age: 86
DRG: 442 | End: 2021-09-30
Attending: EMERGENCY MEDICINE

## 2021-09-30 DIAGNOSIS — E83.42 HYPOMAGNESEMIA: ICD-10-CM

## 2021-09-30 DIAGNOSIS — R17 JAUNDICE: ICD-10-CM

## 2021-09-30 DIAGNOSIS — E87.1 HYPONATREMIA: ICD-10-CM

## 2021-09-30 DIAGNOSIS — R74.01 TRANSAMINITIS: ICD-10-CM

## 2021-09-30 DIAGNOSIS — K74.69 OTHER CIRRHOSIS OF LIVER (CMD): Primary | ICD-10-CM

## 2021-09-30 DIAGNOSIS — N39.0 ACUTE UTI: ICD-10-CM

## 2021-09-30 PROBLEM — R53.81 PHYSICAL DECONDITIONING: Status: ACTIVE | Noted: 2021-08-31

## 2021-09-30 PROBLEM — R29.818 NEUROGENIC CLAUDICATION: Status: ACTIVE | Noted: 2020-07-30

## 2021-09-30 PROBLEM — M43.16 SPONDYLOLISTHESIS AT L4-L5 LEVEL: Status: ACTIVE | Noted: 2020-09-03

## 2021-09-30 PROBLEM — I50.9 CHF (CONGESTIVE HEART FAILURE) (CMD): Status: ACTIVE | Noted: 2021-09-30

## 2021-09-30 PROBLEM — R79.89 AZOTEMIA: Status: ACTIVE | Noted: 2021-07-26

## 2021-09-30 PROBLEM — E78.5 HYPERLIPIDEMIA: Status: ACTIVE | Noted: 2021-09-30

## 2021-09-30 PROBLEM — Z96.652 STATUS POST TOTAL LEFT KNEE REPLACEMENT: Status: ACTIVE | Noted: 2017-05-23

## 2021-09-30 PROBLEM — R55 SYNCOPE: Status: ACTIVE | Noted: 2019-06-12

## 2021-09-30 PROBLEM — M53.3 SI (SACROILIAC) JOINT DYSFUNCTION: Status: ACTIVE | Noted: 2020-09-03

## 2021-09-30 PROBLEM — M17.11 PRIMARY OSTEOARTHRITIS OF RIGHT KNEE: Status: ACTIVE | Noted: 2017-05-23

## 2021-09-30 PROBLEM — K75.81 NASH (NONALCOHOLIC STEATOHEPATITIS): Status: ACTIVE | Noted: 2021-09-21

## 2021-09-30 PROBLEM — E11.9 TYPE 2 DIABETES MELLITUS WITHOUT COMPLICATION (CMD): Status: ACTIVE | Noted: 2021-09-30

## 2021-09-30 PROBLEM — R53.1 GENERALIZED WEAKNESS: Status: ACTIVE | Noted: 2021-08-31

## 2021-09-30 PROBLEM — M54.10 RADICULITIS: Status: ACTIVE | Noted: 2020-09-03

## 2021-09-30 PROBLEM — I10 ESSENTIAL HYPERTENSION: Status: ACTIVE | Noted: 2021-09-30

## 2021-09-30 PROBLEM — Z91.81 AT HIGH RISK FOR FALLS: Status: ACTIVE | Noted: 2021-08-31

## 2021-09-30 PROBLEM — D64.9 ANEMIA: Status: ACTIVE | Noted: 2021-07-26

## 2021-09-30 PROBLEM — R11.2 NAUSEA AND VOMITING IN ADULT: Status: ACTIVE | Noted: 2021-07-26

## 2021-09-30 LAB
ALBUMIN SERPL-MCNC: 1.3 G/DL (ref 3.6–5.1)
ALBUMIN/GLOB SERPL: 0.3 {RATIO} (ref 1–2.4)
ALP SERPL-CCNC: 725 UNITS/L (ref 45–117)
ALT SERPL-CCNC: 78 UNITS/L
AMMONIA PLAS-SCNC: 19 MCMOL/L
ANION GAP SERPL CALC-SCNC: 9 MMOL/L (ref 10–20)
APPEARANCE UR: ABNORMAL
APTT PPP: 31 SEC (ref 22–30)
AST SERPL-CCNC: 167 UNITS/L
BACTERIA #/AREA URNS HPF: ABNORMAL /HPF
BASOPHILS # BLD: 0 K/MCL (ref 0–0.3)
BASOPHILS NFR BLD: 0 %
BILIRUB SERPL-MCNC: 7.5 MG/DL (ref 0.2–1)
BILIRUB UR QL STRIP: POSITIVE
BUN SERPL-MCNC: 8 MG/DL (ref 6–20)
BUN/CREAT SERPL: 17 (ref 7–25)
CALCIUM SERPL-MCNC: 7.4 MG/DL (ref 8.4–10.2)
CHLORIDE SERPL-SCNC: 98 MMOL/L (ref 98–107)
CO2 SERPL-SCNC: 25 MMOL/L (ref 21–32)
COLOR UR: YELLOW
CREAT SERPL-MCNC: 0.48 MG/DL (ref 0.51–0.95)
CREAT UR-MCNC: 18.7 MG/DL
DEPRECATED RDW RBC: 50.6 FL (ref 39–50)
EOSINOPHIL # BLD: 0.3 K/MCL (ref 0–0.5)
EOSINOPHIL NFR BLD: 5 %
ERYTHROCYTE [DISTWIDTH] IN BLOOD: 16.5 % (ref 11–15)
FASTING DURATION TIME PATIENT: ABNORMAL H
GFR SERPLBLD BASED ON 1.73 SQ M-ARVRAT: 88 ML/MIN
GLOBULIN SER-MCNC: 3.9 G/DL (ref 2–4)
GLUCOSE SERPL-MCNC: 147 MG/DL (ref 70–99)
GLUCOSE UR STRIP-MCNC: NEGATIVE MG/DL
HCT VFR BLD CALC: 29.7 % (ref 36–46.5)
HGB BLD-MCNC: 10.2 G/DL (ref 12–15.5)
HGB UR QL STRIP: ABNORMAL
HYALINE CASTS #/AREA URNS LPF: ABNORMAL /LPF
IMM GRANULOCYTES # BLD AUTO: 0.1 K/MCL (ref 0–0.2)
IMM GRANULOCYTES # BLD: 2 %
INR PPP: 1.2
KETONES UR STRIP-MCNC: NEGATIVE MG/DL
LEUKOCYTE ESTERASE UR QL STRIP: ABNORMAL
LIPASE SERPL-CCNC: 67 UNITS/L (ref 73–393)
LYMPHOCYTES # BLD: 0.9 K/MCL (ref 1–4)
LYMPHOCYTES NFR BLD: 16 %
MAGNESIUM SERPL-MCNC: 1.4 MG/DL (ref 1.7–2.4)
MCH RBC QN AUTO: 29.4 PG (ref 26–34)
MCHC RBC AUTO-ENTMCNC: 34.3 G/DL (ref 32–36.5)
MCV RBC AUTO: 85.6 FL (ref 78–100)
MONOCYTES # BLD: 0.4 K/MCL (ref 0.3–0.9)
MONOCYTES NFR BLD: 7 %
NEUTROPHILS # BLD: 3.9 K/MCL (ref 1.8–7.7)
NEUTROPHILS NFR BLD: 70 %
NITRITE UR QL STRIP: NEGATIVE
NRBC BLD MANUAL-RTO: 0 /100 WBC
PH UR STRIP: 7 [PH] (ref 5–7)
PLATELET # BLD AUTO: 178 K/MCL (ref 140–450)
POTASSIUM SERPL-SCNC: 3.4 MMOL/L (ref 3.4–5.1)
PROCALCITONIN SERPL IA-MCNC: 0.15 NG/ML
PROT SERPL-MCNC: 5.2 G/DL (ref 6.4–8.2)
PROT UR STRIP-MCNC: 30 MG/DL
PROTHROMBIN TIME: 12.5 SEC (ref 9.7–11.8)
RBC # BLD: 3.47 MIL/MCL (ref 4–5.2)
RBC #/AREA URNS HPF: ABNORMAL /HPF
SARS-COV-2 RNA RESP QL NAA+PROBE: NOT DETECTED
SERVICE CMNT-IMP: NORMAL
SERVICE CMNT-IMP: NORMAL
SODIUM SERPL-SCNC: 129 MMOL/L (ref 135–145)
SODIUM UR-SCNC: 66 MMOL/L
SP GR UR STRIP: 1.01 (ref 1–1.03)
SQUAMOUS #/AREA URNS HPF: ABNORMAL /HPF
TROPONIN I SERPL HS-MCNC: <0.02 NG/ML
UROBILINOGEN UR STRIP-MCNC: 0.2 MG/DL
WBC # BLD: 5.5 K/MCL (ref 4.2–11)
WBC #/AREA URNS HPF: >100 /HPF

## 2021-09-30 PROCEDURE — 99285 EMERGENCY DEPT VISIT HI MDM: CPT

## 2021-09-30 PROCEDURE — 84145 PROCALCITONIN (PCT): CPT | Performed by: EMERGENCY MEDICINE

## 2021-09-30 PROCEDURE — P9612 CATHETERIZE FOR URINE SPEC: HCPCS | Performed by: EMERGENCY MEDICINE

## 2021-09-30 PROCEDURE — 81001 URINALYSIS AUTO W/SCOPE: CPT | Performed by: EMERGENCY MEDICINE

## 2021-09-30 PROCEDURE — 87635 SARS-COV-2 COVID-19 AMP PRB: CPT | Performed by: EMERGENCY MEDICINE

## 2021-09-30 PROCEDURE — 10002807 HB RX 258: Performed by: EMERGENCY MEDICINE

## 2021-09-30 PROCEDURE — 80053 COMPREHEN METABOLIC PANEL: CPT | Performed by: EMERGENCY MEDICINE

## 2021-09-30 PROCEDURE — 10002800 HB RX 250 W HCPCS: Performed by: EMERGENCY MEDICINE

## 2021-09-30 PROCEDURE — 84300 ASSAY OF URINE SODIUM: CPT | Performed by: EMERGENCY MEDICINE

## 2021-09-30 PROCEDURE — 83690 ASSAY OF LIPASE: CPT | Performed by: EMERGENCY MEDICINE

## 2021-09-30 PROCEDURE — 85730 THROMBOPLASTIN TIME PARTIAL: CPT | Performed by: EMERGENCY MEDICINE

## 2021-09-30 PROCEDURE — 85610 PROTHROMBIN TIME: CPT | Performed by: EMERGENCY MEDICINE

## 2021-09-30 PROCEDURE — 76705 ECHO EXAM OF ABDOMEN: CPT

## 2021-09-30 PROCEDURE — 93005 ELECTROCARDIOGRAM TRACING: CPT | Performed by: EMERGENCY MEDICINE

## 2021-09-30 PROCEDURE — 84484 ASSAY OF TROPONIN QUANT: CPT | Performed by: EMERGENCY MEDICINE

## 2021-09-30 PROCEDURE — 36415 COLL VENOUS BLD VENIPUNCTURE: CPT

## 2021-09-30 PROCEDURE — 82140 ASSAY OF AMMONIA: CPT | Performed by: EMERGENCY MEDICINE

## 2021-09-30 PROCEDURE — 82570 ASSAY OF URINE CREATININE: CPT | Performed by: EMERGENCY MEDICINE

## 2021-09-30 PROCEDURE — 87040 BLOOD CULTURE FOR BACTERIA: CPT | Performed by: EMERGENCY MEDICINE

## 2021-09-30 PROCEDURE — 87086 URINE CULTURE/COLONY COUNT: CPT | Performed by: EMERGENCY MEDICINE

## 2021-09-30 PROCEDURE — 10002803 HB RX 637: Performed by: EMERGENCY MEDICINE

## 2021-09-30 PROCEDURE — 83735 ASSAY OF MAGNESIUM: CPT | Performed by: EMERGENCY MEDICINE

## 2021-09-30 PROCEDURE — 10000002 HB ROOM CHARGE MED SURG

## 2021-09-30 PROCEDURE — 85025 COMPLETE CBC W/AUTO DIFF WBC: CPT | Performed by: EMERGENCY MEDICINE

## 2021-09-30 PROCEDURE — C9803 HOPD COVID-19 SPEC COLLECT: HCPCS

## 2021-09-30 RX ORDER — LANOLIN ALCOHOL/MO/W.PET/CERES
400 CREAM (GRAM) TOPICAL ONCE
Status: COMPLETED | OUTPATIENT
Start: 2021-09-30 | End: 2021-09-30

## 2021-09-30 RX ORDER — ALPRAZOLAM 0.25 MG/1
0.25 TABLET ORAL
Status: ON HOLD | COMMUNITY
Start: 2021-08-04 | End: 2021-10-01

## 2021-09-30 RX ORDER — CEFAZOLIN SODIUM/WATER 1 G/10 ML
1000 SYRINGE (ML) INTRAVENOUS ONCE
Status: COMPLETED | OUTPATIENT
Start: 2021-09-30 | End: 2021-09-30

## 2021-09-30 RX ORDER — BLOOD SUGAR DIAGNOSTIC
STRIP MISCELLANEOUS
COMMUNITY
Start: 2021-07-26

## 2021-09-30 RX ORDER — GLUCOSAMINE HCL/CHONDROITIN SU 500-400 MG
CAPSULE ORAL 2 TIMES DAILY
Status: ON HOLD | COMMUNITY
Start: 2021-07-10 | End: 2021-10-01

## 2021-09-30 RX ORDER — ASPIRIN 81 MG/1
81 TABLET ORAL DAILY
Status: ON HOLD | COMMUNITY
End: 2021-10-01

## 2021-09-30 RX ORDER — ISOSORBIDE MONONITRATE 60 MG/1
60 TABLET, EXTENDED RELEASE ORAL DAILY
COMMUNITY
Start: 2021-06-15

## 2021-09-30 RX ORDER — ONDANSETRON 2 MG/ML
4 INJECTION INTRAMUSCULAR; INTRAVENOUS EVERY 12 HOURS PRN
Status: DISCONTINUED | OUTPATIENT
Start: 2021-09-30 | End: 2021-10-01 | Stop reason: HOSPADM

## 2021-09-30 RX ADMIN — MAGNESIUM SULFATE HEPTAHYDRATE 2 G: 40 INJECTION, SOLUTION INTRAVENOUS at 22:32

## 2021-09-30 RX ADMIN — Medication 400 MG: at 22:32

## 2021-09-30 RX ADMIN — SODIUM CHLORIDE 1000 ML: 0.9 INJECTION, SOLUTION INTRAVENOUS at 22:32

## 2021-09-30 RX ADMIN — Medication 1000 MG: at 22:32

## 2021-09-30 ASSESSMENT — ENCOUNTER SYMPTOMS
COUGH: 0
ABDOMINAL PAIN: 1
VOMITING: 0
RESPIRATORY NEGATIVE: 1
DIZZINESS: 0
PSYCHIATRIC NEGATIVE: 1
BRUISES/BLEEDS EASILY: 1
NAUSEA: 0
UNEXPECTED WEIGHT CHANGE: 1
FEVER: 0
COLOR CHANGE: 1
EYES NEGATIVE: 1
ACTIVITY CHANGE: 1
HEADACHES: 0
APPETITE CHANGE: 1
SHORTNESS OF BREATH: 0
FATIGUE: 1

## 2021-09-30 ASSESSMENT — LIFESTYLE VARIABLES
HOW MANY STANDARD DRINKS CONTAINING ALCOHOL DO YOU HAVE ON A TYPICAL DAY: 0,1 OR 2
ALCOHOL_USE_STATUS: NO OR LOW RISK WITH VALIDATED TOOL
HOW OFTEN DO YOU HAVE A DRINK CONTAINING ALCOHOL: NEVER

## 2021-09-30 ASSESSMENT — COGNITIVE AND FUNCTIONAL STATUS - GENERAL
ARE YOU BLIND OR DO YOU HAVE SERIOUS DIFFICULTY SEEING, EVEN WHEN WEARING GLASSES: NO
BECAUSE OF A PHYSICAL, MENTAL, OR EMOTIONAL CONDITION, DO YOU HAVE DIFFICULTY DOING ERRANDS ALONE: YES
ARE YOU DEAF OR DO YOU HAVE SERIOUS DIFFICULTY  HEARING: NO
DO YOU HAVE SERIOUS DIFFICULTY WALKING OR CLIMBING STAIRS: YES
BECAUSE OF A PHYSICAL, MENTAL, OR EMOTIONAL CONDITION, DO YOU HAVE SERIOUS DIFFICULTY CONCENTRATING, REMEMBERING OR MAKING DECISIONS: NO
DO YOU HAVE DIFFICULTY DRESSING OR BATHING: YES

## 2021-09-30 ASSESSMENT — ACTIVITIES OF DAILY LIVING (ADL)
FEEDING YOURSELF: INDEPENDENT
TOILETING: DEPENDENT
ADL_BEFORE_ADMISSION: NEEDS/REQUIRES ASSISTANCE
ADL_SHORT_OF_BREATH: NO
DESCRIBE HOW PAIN IMPACTS YOUR LIFE: MOBILITY
CHRONIC_PAIN_PRESENT: YES, CHRONIC
TRANSFERRING: DEPENDENT
CONTINENCE: DEPENDENT
RECENT_DECLINE_ADL: YES, DECLINE IN BATHING/DRESSING/FEEDING, COLLABORATE WITH PROVIDER (T);YES, DECLINE IN AMBULATION/TRANSFERRING, COLLABORATE WITH PROVIDER (T)
DRESSING YOURSELF: DEPENDENT
ADL_SCORE: 2
MOBILITY_ASSIST_DEVICES: WHEELCHAIR
BATHING: DEPENDENT

## 2021-09-30 ASSESSMENT — PAIN SCALES - GENERAL: PAINLEVEL_OUTOF10: 0

## 2021-10-01 VITALS
SYSTOLIC BLOOD PRESSURE: 138 MMHG | BODY MASS INDEX: 28.87 KG/M2 | TEMPERATURE: 97.5 F | WEIGHT: 147.05 LBS | RESPIRATION RATE: 16 BRPM | HEIGHT: 60 IN | DIASTOLIC BLOOD PRESSURE: 69 MMHG | OXYGEN SATURATION: 98 % | HEART RATE: 69 BPM

## 2021-10-01 LAB
ALBUMIN SERPL-MCNC: 1.3 G/DL (ref 3.6–5.1)
ALBUMIN/GLOB SERPL: 0.4 {RATIO} (ref 1–2.4)
ALP SERPL-CCNC: 715 UNITS/L (ref 45–117)
ALT SERPL-CCNC: 78 UNITS/L
ANION GAP SERPL CALC-SCNC: 9 MMOL/L (ref 10–20)
AST SERPL-CCNC: 167 UNITS/L
ATRIAL RATE (BPM): 70
BASOPHILS # BLD: 0 K/MCL (ref 0–0.3)
BASOPHILS NFR BLD: 0 %
BILIRUB SERPL-MCNC: 7.5 MG/DL (ref 0.2–1)
BUN SERPL-MCNC: 8 MG/DL (ref 6–20)
BUN/CREAT SERPL: 17 (ref 7–25)
CALCIUM SERPL-MCNC: 7.8 MG/DL (ref 8.4–10.2)
CHLORIDE SERPL-SCNC: 98 MMOL/L (ref 98–107)
CO2 SERPL-SCNC: 27 MMOL/L (ref 21–32)
CREAT SERPL-MCNC: 0.47 MG/DL (ref 0.51–0.95)
DEPRECATED RDW RBC: 50.2 FL (ref 39–50)
EOSINOPHIL # BLD: 0.4 K/MCL (ref 0–0.5)
EOSINOPHIL NFR BLD: 7 %
ERYTHROCYTE [DISTWIDTH] IN BLOOD: 16.5 % (ref 11–15)
FASTING DURATION TIME PATIENT: ABNORMAL H
GFR SERPLBLD BASED ON 1.73 SQ M-ARVRAT: 89 ML/MIN
GLOBULIN SER-MCNC: 3.4 G/DL (ref 2–4)
GLUCOSE BLDC GLUCOMTR-MCNC: 147 MG/DL (ref 70–99)
GLUCOSE BLDC GLUCOMTR-MCNC: 168 MG/DL (ref 70–99)
GLUCOSE SERPL-MCNC: 109 MG/DL (ref 70–99)
HCT VFR BLD CALC: 28.9 % (ref 36–46.5)
HGB BLD-MCNC: 9.9 G/DL (ref 12–15.5)
IMM GRANULOCYTES # BLD AUTO: 0.1 K/MCL (ref 0–0.2)
IMM GRANULOCYTES # BLD: 2 %
LYMPHOCYTES # BLD: 0.9 K/MCL (ref 1–4)
LYMPHOCYTES NFR BLD: 17 %
MCH RBC QN AUTO: 29.1 PG (ref 26–34)
MCHC RBC AUTO-ENTMCNC: 34.3 G/DL (ref 32–36.5)
MCV RBC AUTO: 85 FL (ref 78–100)
MONOCYTES # BLD: 0.4 K/MCL (ref 0.3–0.9)
MONOCYTES NFR BLD: 7 %
NEUTROPHILS # BLD: 3.8 K/MCL (ref 1.8–7.7)
NEUTROPHILS NFR BLD: 67 %
NRBC BLD MANUAL-RTO: 0 /100 WBC
P AXIS (DEGREES): 25
PLATELET # BLD AUTO: 190 K/MCL (ref 140–450)
POTASSIUM SERPL-SCNC: 3.6 MMOL/L (ref 3.4–5.1)
PR-INTERVAL (MSEC): 118
PROT SERPL-MCNC: 4.7 G/DL (ref 6.4–8.2)
QRS-INTERVAL (MSEC): 102
QT-INTERVAL (MSEC): 410
QTC: 442
R AXIS (DEGREES): -7
RAINBOW EXTRA TUBES HOLD SPECIMEN: NORMAL
RBC # BLD: 3.4 MIL/MCL (ref 4–5.2)
REPORT TEXT: NORMAL
SODIUM SERPL-SCNC: 130 MMOL/L (ref 135–145)
T AXIS (DEGREES): 31
VENTRICULAR RATE EKG/MIN (BPM): 70
WBC # BLD: 5.6 K/MCL (ref 4.2–11)

## 2021-10-01 PROCEDURE — 10002803 HB RX 637: Performed by: INTERNAL MEDICINE

## 2021-10-01 PROCEDURE — 80053 COMPREHEN METABOLIC PANEL: CPT | Performed by: INTERNAL MEDICINE

## 2021-10-01 PROCEDURE — 10004651 HB RX, NO CHARGE ITEM: Performed by: INTERNAL MEDICINE

## 2021-10-01 PROCEDURE — 85025 COMPLETE CBC W/AUTO DIFF WBC: CPT | Performed by: INTERNAL MEDICINE

## 2021-10-01 PROCEDURE — 36415 COLL VENOUS BLD VENIPUNCTURE: CPT | Performed by: INTERNAL MEDICINE

## 2021-10-01 RX ORDER — CARVEDILOL 25 MG/1
25 TABLET ORAL 2 TIMES DAILY WITH MEALS
Status: DISCONTINUED | OUTPATIENT
Start: 2021-10-01 | End: 2021-10-01 | Stop reason: HOSPADM

## 2021-10-01 RX ORDER — PREDNISONE 5 MG/1
15 TABLET ORAL 2 TIMES DAILY WITH MEALS
Qty: 30 TABLET | Refills: 0 | Status: ON HOLD | OUTPATIENT
Start: 2021-10-01 | End: 2021-12-07 | Stop reason: HOSPADM

## 2021-10-01 RX ORDER — CHOLESTYRAMINE LIGHT 4 G/5.7G
4 POWDER, FOR SUSPENSION ORAL DAILY
Status: DISCONTINUED | OUTPATIENT
Start: 2021-10-01 | End: 2021-10-01 | Stop reason: HOSPADM

## 2021-10-01 RX ORDER — PREDNISONE 5 MG/1
15 TABLET ORAL 2 TIMES DAILY WITH MEALS
Status: DISCONTINUED | OUTPATIENT
Start: 2021-10-01 | End: 2021-10-01 | Stop reason: HOSPADM

## 2021-10-01 RX ORDER — ISOSORBIDE MONONITRATE 30 MG/1
60 TABLET, EXTENDED RELEASE ORAL DAILY
Status: DISCONTINUED | OUTPATIENT
Start: 2021-10-01 | End: 2021-10-01 | Stop reason: HOSPADM

## 2021-10-01 RX ORDER — TRAMADOL HYDROCHLORIDE 50 MG/1
50 TABLET ORAL EVERY 6 HOURS PRN
Status: DISCONTINUED | OUTPATIENT
Start: 2021-10-01 | End: 2021-10-01 | Stop reason: HOSPADM

## 2021-10-01 RX ORDER — POTASSIUM CHLORIDE 20 MEQ/1
40 TABLET, EXTENDED RELEASE ORAL ONCE
Status: COMPLETED | OUTPATIENT
Start: 2021-10-01 | End: 2021-10-01

## 2021-10-01 RX ORDER — DIPHENHYDRAMINE HCL 25 MG
25 CAPSULE ORAL EVERY 6 HOURS PRN
Status: DISCONTINUED | OUTPATIENT
Start: 2021-10-01 | End: 2021-10-01 | Stop reason: HOSPADM

## 2021-10-01 RX ORDER — LOSARTAN POTASSIUM 50 MG/1
50 TABLET ORAL 2 TIMES DAILY
Status: DISCONTINUED | OUTPATIENT
Start: 2021-10-01 | End: 2021-10-01 | Stop reason: HOSPADM

## 2021-10-01 RX ORDER — POLYETHYLENE GLYCOL 3350 17 G/17G
17 POWDER, FOR SOLUTION ORAL DAILY
Status: DISCONTINUED | OUTPATIENT
Start: 2021-10-01 | End: 2021-10-01 | Stop reason: HOSPADM

## 2021-10-01 RX ORDER — LIDOCAINE 4 G/G
1 PATCH TOPICAL DAILY
Status: DISCONTINUED | OUTPATIENT
Start: 2021-10-01 | End: 2021-10-01 | Stop reason: HOSPADM

## 2021-10-01 RX ORDER — PREDNISONE 5 MG/1
15 TABLET ORAL ONCE
Status: COMPLETED | OUTPATIENT
Start: 2021-10-01 | End: 2021-10-01

## 2021-10-01 RX ADMIN — POTASSIUM CHLORIDE 40 MEQ: 1500 TABLET, EXTENDED RELEASE ORAL at 02:19

## 2021-10-01 RX ADMIN — LIDOCAINE 1 PATCH: 246 PATCH TOPICAL at 08:53

## 2021-10-01 RX ADMIN — CARVEDILOL 25 MG: 25 TABLET, FILM COATED ORAL at 17:36

## 2021-10-01 RX ADMIN — TRAMADOL HYDROCHLORIDE 50 MG: 50 TABLET ORAL at 00:46

## 2021-10-01 RX ADMIN — TRAMADOL HYDROCHLORIDE 50 MG: 50 TABLET ORAL at 17:35

## 2021-10-01 RX ADMIN — PREDNISONE 15 MG: 5 TABLET ORAL at 16:10

## 2021-10-01 RX ADMIN — LOSARTAN POTASSIUM 50 MG: 50 TABLET, FILM COATED ORAL at 08:58

## 2021-10-01 RX ADMIN — DIPHENHYDRAMINE HYDROCHLORIDE 25 MG: 25 CAPSULE ORAL at 00:46

## 2021-10-01 RX ADMIN — CHOLESTYRAMINE 4 G: 4 POWDER, FOR SUSPENSION ORAL at 08:54

## 2021-10-01 RX ADMIN — PREDNISONE 15 MG: 5 TABLET ORAL at 11:59

## 2021-10-01 RX ADMIN — POTASSIUM CHLORIDE 40 MEQ: 1500 TABLET, EXTENDED RELEASE ORAL at 08:58

## 2021-10-01 RX ADMIN — ASPIRIN 81 MG: 81 TABLET, COATED ORAL at 08:58

## 2021-10-01 RX ADMIN — CARVEDILOL 25 MG: 25 TABLET, FILM COATED ORAL at 08:58

## 2021-10-01 RX ADMIN — ISOSORBIDE MONONITRATE 60 MG: 30 TABLET, EXTENDED RELEASE ORAL at 08:58

## 2021-10-01 RX ADMIN — LINAGLIPTIN 5 MG: 5 TABLET, FILM COATED ORAL at 11:59

## 2021-10-01 RX ADMIN — POLYETHYLENE GLYCOL (3350) 17 G: 17 POWDER, FOR SOLUTION ORAL at 09:01

## 2021-10-01 RX ADMIN — CALCIUM CARBONATE 600 MG (1,500 MG)-VITAMIN D3 400 UNIT TABLET 1 TABLET: at 08:54

## 2021-10-01 ASSESSMENT — PAIN SCALES - GENERAL
PAINLEVEL_OUTOF10: 5
PAINLEVEL_OUTOF10: 5
PAINLEVEL_OUTOF10: 3

## 2021-10-02 LAB — BACTERIA UR CULT: ABNORMAL

## 2021-10-05 LAB
BACTERIA BLD CULT: NORMAL
BACTERIA BLD CULT: NORMAL

## 2021-10-21 ENCOUNTER — APPOINTMENT (OUTPATIENT)
Dept: ULTRASOUND IMAGING | Age: 86
End: 2021-10-21
Attending: INTERNAL MEDICINE

## 2021-10-21 ENCOUNTER — APPOINTMENT (OUTPATIENT)
Dept: NUCLEAR MEDICINE | Age: 86
End: 2021-10-21
Attending: INTERNAL MEDICINE

## 2021-11-13 ENCOUNTER — HOSPITAL ENCOUNTER (EMERGENCY)
Age: 86
Discharge: NURSING, REHAB OR LONG-TERM CARE | End: 2021-11-13
Attending: EMERGENCY MEDICINE

## 2021-11-13 VITALS
SYSTOLIC BLOOD PRESSURE: 137 MMHG | HEIGHT: 60 IN | WEIGHT: 143.74 LBS | BODY MASS INDEX: 28.22 KG/M2 | RESPIRATION RATE: 18 BRPM | OXYGEN SATURATION: 100 % | TEMPERATURE: 97.9 F | HEART RATE: 87 BPM | DIASTOLIC BLOOD PRESSURE: 79 MMHG

## 2021-11-13 DIAGNOSIS — I82.411 ACUTE DEEP VEIN THROMBOSIS (DVT) OF FEMORAL VEIN OF RIGHT LOWER EXTREMITY (CMD): Primary | ICD-10-CM

## 2021-11-13 LAB
ALBUMIN SERPL-MCNC: 1.5 G/DL (ref 3.6–5.1)
ALBUMIN/GLOB SERPL: 0.5 {RATIO} (ref 1–2.4)
ALP SERPL-CCNC: 470 UNITS/L (ref 45–117)
ALT SERPL-CCNC: 126 UNITS/L
ANION GAP SERPL CALC-SCNC: 9 MMOL/L (ref 10–20)
APTT PPP: 31 SEC (ref 22–30)
AST SERPL-CCNC: 80 UNITS/L
BASOPHILS # BLD: 0 K/MCL (ref 0–0.3)
BASOPHILS NFR BLD: 0 %
BILIRUB SERPL-MCNC: 3.4 MG/DL (ref 0.2–1)
BUN SERPL-MCNC: 36 MG/DL (ref 6–20)
BUN/CREAT SERPL: 63 (ref 7–25)
CALCIUM SERPL-MCNC: 7.7 MG/DL (ref 8.4–10.2)
CHLORIDE SERPL-SCNC: 94 MMOL/L (ref 98–107)
CO2 SERPL-SCNC: 28 MMOL/L (ref 21–32)
CREAT SERPL-MCNC: 0.57 MG/DL (ref 0.51–0.95)
DEPRECATED RDW RBC: 58.4 FL (ref 39–50)
EOSINOPHIL # BLD: 0 K/MCL (ref 0–0.5)
EOSINOPHIL NFR BLD: 0 %
ERYTHROCYTE [DISTWIDTH] IN BLOOD: 17.5 % (ref 11–15)
FASTING DURATION TIME PATIENT: ABNORMAL H
GFR SERPLBLD BASED ON 1.73 SQ M-ARVRAT: 84 ML/MIN
GLOBULIN SER-MCNC: 3.2 G/DL (ref 2–4)
GLUCOSE SERPL-MCNC: 213 MG/DL (ref 70–99)
HCT VFR BLD CALC: 32.7 % (ref 36–46.5)
HGB BLD-MCNC: 10.9 G/DL (ref 12–15.5)
INR PPP: 1.2
LYMPHOCYTES # BLD: 0.6 K/MCL (ref 1–4)
LYMPHOCYTES NFR BLD: 6 %
MCH RBC QN AUTO: 30.1 PG (ref 26–34)
MCHC RBC AUTO-ENTMCNC: 33.3 G/DL (ref 32–36.5)
MCV RBC AUTO: 90.3 FL (ref 78–100)
MONOCYTES # BLD: 0.3 K/MCL (ref 0.3–0.9)
MONOCYTES NFR BLD: 4 %
MYELOCYTES # BLD MANUAL: 1 %
NEUTROPHILS # BLD: 6 K/MCL (ref 1.8–7.7)
NEUTS BAND NFR BLD: 3 % (ref 0–10)
NEUTS SEG NFR BLD: 84 %
NRBC BLD MANUAL-RTO: 0 /100 WBC
PLAT MORPH BLD: NORMAL
PLATELET # BLD AUTO: 83 K/MCL (ref 140–450)
POTASSIUM SERPL-SCNC: 4.1 MMOL/L (ref 3.4–5.1)
PROT SERPL-MCNC: 4.7 G/DL (ref 6.4–8.2)
PROTHROMBIN TIME: 12.7 SEC (ref 9.7–11.8)
RAINBOW EXTRA TUBES HOLD SPECIMEN: NORMAL
RBC # BLD: 3.62 MIL/MCL (ref 4–5.2)
RBC MORPH BLD: NORMAL
SARS-COV-2 RNA RESP QL NAA+PROBE: NOT DETECTED
SERVICE CMNT-IMP: NORMAL
SERVICE CMNT-IMP: NORMAL
SODIUM SERPL-SCNC: 127 MMOL/L (ref 135–145)
VARIANT LYMPHS NFR BLD: 2 % (ref 0–5)
WBC # BLD: 6.9 K/MCL (ref 4.2–11)
WBC MORPH BLD: NORMAL

## 2021-11-13 PROCEDURE — 85730 THROMBOPLASTIN TIME PARTIAL: CPT | Performed by: EMERGENCY MEDICINE

## 2021-11-13 PROCEDURE — C9803 HOPD COVID-19 SPEC COLLECT: HCPCS

## 2021-11-13 PROCEDURE — 99283 EMERGENCY DEPT VISIT LOW MDM: CPT

## 2021-11-13 PROCEDURE — 96360 HYDRATION IV INFUSION INIT: CPT

## 2021-11-13 PROCEDURE — 96361 HYDRATE IV INFUSION ADD-ON: CPT

## 2021-11-13 PROCEDURE — 87635 SARS-COV-2 COVID-19 AMP PRB: CPT | Performed by: EMERGENCY MEDICINE

## 2021-11-13 PROCEDURE — 10002803 HB RX 637: Performed by: EMERGENCY MEDICINE

## 2021-11-13 PROCEDURE — 10002807 HB RX 258: Performed by: EMERGENCY MEDICINE

## 2021-11-13 PROCEDURE — 80053 COMPREHEN METABOLIC PANEL: CPT | Performed by: EMERGENCY MEDICINE

## 2021-11-13 PROCEDURE — 85610 PROTHROMBIN TIME: CPT | Performed by: EMERGENCY MEDICINE

## 2021-11-13 PROCEDURE — 85027 COMPLETE CBC AUTOMATED: CPT | Performed by: EMERGENCY MEDICINE

## 2021-11-13 RX ADMIN — SODIUM CHLORIDE 500 ML: 0.9 INJECTION, SOLUTION INTRAVENOUS at 09:43

## 2021-11-13 RX ADMIN — APIXABAN 2.5 MG: 2.5 TABLET, FILM COATED ORAL at 12:15

## 2021-11-13 ASSESSMENT — ENCOUNTER SYMPTOMS
POLYDIPSIA: 0
HEADACHES: 0
COLOR CHANGE: 0
EYE DISCHARGE: 0
EYE REDNESS: 0
EYE PAIN: 0
CONFUSION: 0
COUGH: 0
VOMITING: 0
BRUISES/BLEEDS EASILY: 0
FACIAL SWELLING: 0
POLYPHAGIA: 0
NUMBNESS: 0
ACTIVITY CHANGE: 0
SHORTNESS OF BREATH: 0
ABDOMINAL PAIN: 0
WHEEZING: 0
ROS GI COMMENTS: JAUNDICED
CHILLS: 0
WOUND: 0
SORE THROAT: 0
DIZZINESS: 0
DIARRHEA: 0
NAUSEA: 0
BACK PAIN: 0
FEVER: 0

## 2021-12-01 ENCOUNTER — APPOINTMENT (OUTPATIENT)
Dept: GENERAL RADIOLOGY | Age: 86
DRG: 871 | End: 2021-12-01
Attending: EMERGENCY MEDICINE

## 2021-12-01 ENCOUNTER — HOSPITAL ENCOUNTER (INPATIENT)
Age: 86
LOS: 6 days | Discharge: NURSING, REHAB OR LONG-TERM CARE | DRG: 871 | End: 2021-12-07
Attending: EMERGENCY MEDICINE | Admitting: INTERNAL MEDICINE

## 2021-12-01 ENCOUNTER — APPOINTMENT (OUTPATIENT)
Dept: CT IMAGING | Age: 86
DRG: 871 | End: 2021-12-01
Attending: EMERGENCY MEDICINE

## 2021-12-01 DIAGNOSIS — K92.1 BLOOD IN STOOL: ICD-10-CM

## 2021-12-01 DIAGNOSIS — N30.01 ACUTE CYSTITIS WITH HEMATURIA: ICD-10-CM

## 2021-12-01 DIAGNOSIS — A41.9 SEPSIS DUE TO UNDETERMINED ORGANISM WITH METABOLIC ENCEPHALOPATHY (CMD): Primary | ICD-10-CM

## 2021-12-01 DIAGNOSIS — R65.20 SEPSIS DUE TO UNDETERMINED ORGANISM WITH METABOLIC ENCEPHALOPATHY (CMD): Primary | ICD-10-CM

## 2021-12-01 DIAGNOSIS — K25.9 GASTRIC EROSION, UNSPECIFIED CHRONICITY: ICD-10-CM

## 2021-12-01 DIAGNOSIS — G93.41 SEPSIS DUE TO UNDETERMINED ORGANISM WITH METABOLIC ENCEPHALOPATHY (CMD): Primary | ICD-10-CM

## 2021-12-01 DIAGNOSIS — K26.9 DUODENAL ULCER: ICD-10-CM

## 2021-12-01 PROBLEM — N20.0 RENAL STONE: Status: ACTIVE | Noted: 2021-12-01

## 2021-12-01 PROBLEM — Z86.718 PERSONAL HISTORY OF DVT (DEEP VEIN THROMBOSIS): Status: ACTIVE | Noted: 2021-12-01

## 2021-12-01 PROBLEM — R53.81 DEBILITY: Status: ACTIVE | Noted: 2021-08-31

## 2021-12-01 PROBLEM — K75.4 AUTOIMMUNE HEPATITIS (CMD): Status: ACTIVE | Noted: 2021-12-01

## 2021-12-01 PROBLEM — S31.000A SACRAL WOUND: Status: ACTIVE | Noted: 2021-12-01

## 2021-12-01 LAB
ABO + RH BLD: NORMAL
ALBUMIN SERPL-MCNC: 1.3 G/DL (ref 3.6–5.1)
ALP SERPL-CCNC: 315 UNITS/L (ref 45–117)
ALT SERPL-CCNC: 46 UNITS/L
AMMONIA PLAS-SCNC: 10 MCMOL/L
ANION GAP SERPL CALC-SCNC: 11 MMOL/L (ref 10–20)
APPEARANCE UR: ABNORMAL
APTT PPP: 31 SEC (ref 22–30)
AST SERPL-CCNC: 40 UNITS/L
BACTERIA #/AREA URNS HPF: ABNORMAL /HPF
BASOPHILS # BLD: 0 K/MCL (ref 0–0.3)
BASOPHILS NFR BLD: 0 %
BILIRUB CONJ SERPL-MCNC: 2.3 MG/DL (ref 0–0.2)
BILIRUB SERPL-MCNC: 2.8 MG/DL (ref 0.2–1)
BILIRUB UR QL STRIP: NEGATIVE
BLD GP AB SCN SERPL QL GEL: NEGATIVE
BUN SERPL-MCNC: 49 MG/DL (ref 6–20)
BUN/CREAT SERPL: 62 (ref 7–25)
CALCIUM SERPL-MCNC: 7.7 MG/DL (ref 8.4–10.2)
CHLORIDE SERPL-SCNC: 94 MMOL/L (ref 98–107)
CO2 SERPL-SCNC: 30 MMOL/L (ref 21–32)
COLOR UR: ABNORMAL
CREAT SERPL-MCNC: 0.79 MG/DL (ref 0.51–0.95)
DEPRECATED RDW RBC: 53.6 FL (ref 39–50)
EOSINOPHIL # BLD: 0 K/MCL (ref 0–0.5)
EOSINOPHIL NFR BLD: 0 %
ERYTHROCYTE [DISTWIDTH] IN BLOOD: 15.9 % (ref 11–15)
FASTING DURATION TIME PATIENT: ABNORMAL H
GFR SERPLBLD BASED ON 1.73 SQ M-ARVRAT: 68 ML/MIN
GLUCOSE BLDC GLUCOMTR-MCNC: 102 MG/DL (ref 70–99)
GLUCOSE BLDC GLUCOMTR-MCNC: 90 MG/DL (ref 70–99)
GLUCOSE SERPL-MCNC: 99 MG/DL (ref 70–99)
GLUCOSE UR STRIP-MCNC: NEGATIVE MG/DL
HCT VFR BLD CALC: 25.3 % (ref 36–46.5)
HGB BLD-MCNC: 8.2 G/DL (ref 12–15.5)
HGB UR QL STRIP: ABNORMAL
HYALINE CASTS #/AREA URNS LPF: ABNORMAL /LPF
HYPOCHROMIA BLD QL SMEAR: ABNORMAL
INR PPP: 1.1
KETONES UR STRIP-MCNC: NEGATIVE MG/DL
LACTATE BLDV-SCNC: 0.9 MMOL/L
LEUKOCYTE ESTERASE UR QL STRIP: ABNORMAL
LIPASE SERPL-CCNC: <50 UNITS/L (ref 73–393)
LYMPHOCYTES # BLD: 0.1 K/MCL (ref 1–4)
LYMPHOCYTES NFR BLD: 1 %
MAGNESIUM SERPL-MCNC: 1.6 MG/DL (ref 1.7–2.4)
MCH RBC QN AUTO: 29.9 PG (ref 26–34)
MCHC RBC AUTO-ENTMCNC: 32.4 G/DL (ref 32–36.5)
MCV RBC AUTO: 92.3 FL (ref 78–100)
METAMYELOCYTES NFR BLD: 1 % (ref 0–2)
MONOCYTES # BLD: 0.1 K/MCL (ref 0.3–0.9)
MONOCYTES NFR BLD: 1 %
NEUTROPHILS # BLD: 8.1 K/MCL (ref 1.8–7.7)
NEUTS BAND NFR BLD: 6 % (ref 0–10)
NEUTS SEG NFR BLD: 91 %
NITRITE UR QL STRIP: NEGATIVE
NRBC BLD MANUAL-RTO: 0 /100 WBC
PH UR STRIP: 8 [PH] (ref 5–7)
PLAT MORPH BLD: NORMAL
PLATELET # BLD AUTO: 115 K/MCL (ref 140–450)
POTASSIUM SERPL-SCNC: 4.5 MMOL/L (ref 3.4–5.1)
PROCALCITONIN SERPL IA-MCNC: 0.36 NG/ML
PROT SERPL-MCNC: 4.4 G/DL (ref 6.4–8.2)
PROT UR STRIP-MCNC: 100 MG/DL
PROTHROMBIN TIME: 12.1 SEC (ref 9.7–11.8)
RBC # BLD: 2.74 MIL/MCL (ref 4–5.2)
RBC #/AREA URNS HPF: >100 /HPF
SARS-COV-2 RNA RESP QL NAA+PROBE: NOT DETECTED
SERVICE CMNT-IMP: NORMAL
SERVICE CMNT-IMP: NORMAL
SODIUM SERPL-SCNC: 130 MMOL/L (ref 135–145)
SP GR UR STRIP: 1.01 (ref 1–1.03)
SQUAMOUS #/AREA URNS HPF: ABNORMAL /HPF
TROPONIN I SERPL DL<=0.01 NG/ML-MCNC: 12 NG/L
TYPE AND SCREEN EXPIRATION DATE: NORMAL
UROBILINOGEN UR STRIP-MCNC: 0.2 MG/DL
WBC # BLD: 8.4 K/MCL (ref 4.2–11)
WBC #/AREA URNS HPF: >100 /HPF
WBC MORPH BLD: NORMAL

## 2021-12-01 PROCEDURE — 85730 THROMBOPLASTIN TIME PARTIAL: CPT | Performed by: EMERGENCY MEDICINE

## 2021-12-01 PROCEDURE — 85610 PROTHROMBIN TIME: CPT | Performed by: EMERGENCY MEDICINE

## 2021-12-01 PROCEDURE — 10002805 HB CONTRAST AGENT: Performed by: EMERGENCY MEDICINE

## 2021-12-01 PROCEDURE — 82962 GLUCOSE BLOOD TEST: CPT

## 2021-12-01 PROCEDURE — 87077 CULTURE AEROBIC IDENTIFY: CPT | Performed by: EMERGENCY MEDICINE

## 2021-12-01 PROCEDURE — 74177 CT ABD & PELVIS W/CONTRAST: CPT

## 2021-12-01 PROCEDURE — 99223 1ST HOSP IP/OBS HIGH 75: CPT | Performed by: INTERNAL MEDICINE

## 2021-12-01 PROCEDURE — 87635 SARS-COV-2 COVID-19 AMP PRB: CPT | Performed by: EMERGENCY MEDICINE

## 2021-12-01 PROCEDURE — 80048 BASIC METABOLIC PNL TOTAL CA: CPT | Performed by: EMERGENCY MEDICINE

## 2021-12-01 PROCEDURE — C9803 HOPD COVID-19 SPEC COLLECT: HCPCS

## 2021-12-01 PROCEDURE — 10003585 HB ROOM CHARGE INTERMEDIATE CARE

## 2021-12-01 PROCEDURE — 85027 COMPLETE CBC AUTOMATED: CPT | Performed by: EMERGENCY MEDICINE

## 2021-12-01 PROCEDURE — 13003289 HB OXYGEN THERAPY DAILY

## 2021-12-01 PROCEDURE — 80076 HEPATIC FUNCTION PANEL: CPT | Performed by: EMERGENCY MEDICINE

## 2021-12-01 PROCEDURE — 87186 SC STD MICRODIL/AGAR DIL: CPT | Performed by: EMERGENCY MEDICINE

## 2021-12-01 PROCEDURE — 86900 BLOOD TYPING SEROLOGIC ABO: CPT | Performed by: EMERGENCY MEDICINE

## 2021-12-01 PROCEDURE — 84484 ASSAY OF TROPONIN QUANT: CPT | Performed by: EMERGENCY MEDICINE

## 2021-12-01 PROCEDURE — 81001 URINALYSIS AUTO W/SCOPE: CPT | Performed by: EMERGENCY MEDICINE

## 2021-12-01 PROCEDURE — 10002807 HB RX 258: Performed by: EMERGENCY MEDICINE

## 2021-12-01 PROCEDURE — 84145 PROCALCITONIN (PCT): CPT | Performed by: EMERGENCY MEDICINE

## 2021-12-01 PROCEDURE — 10002800 HB RX 250 W HCPCS: Performed by: EMERGENCY MEDICINE

## 2021-12-01 PROCEDURE — 87086 URINE CULTURE/COLONY COUNT: CPT | Performed by: EMERGENCY MEDICINE

## 2021-12-01 PROCEDURE — 96365 THER/PROPH/DIAG IV INF INIT: CPT

## 2021-12-01 PROCEDURE — 36415 COLL VENOUS BLD VENIPUNCTURE: CPT

## 2021-12-01 PROCEDURE — 82140 ASSAY OF AMMONIA: CPT | Performed by: EMERGENCY MEDICINE

## 2021-12-01 PROCEDURE — 93005 ELECTROCARDIOGRAM TRACING: CPT | Performed by: EMERGENCY MEDICINE

## 2021-12-01 PROCEDURE — 83690 ASSAY OF LIPASE: CPT | Performed by: EMERGENCY MEDICINE

## 2021-12-01 PROCEDURE — 99285 EMERGENCY DEPT VISIT HI MDM: CPT

## 2021-12-01 PROCEDURE — 71045 X-RAY EXAM CHEST 1 VIEW: CPT

## 2021-12-01 PROCEDURE — 83735 ASSAY OF MAGNESIUM: CPT | Performed by: EMERGENCY MEDICINE

## 2021-12-01 PROCEDURE — 83605 ASSAY OF LACTIC ACID: CPT

## 2021-12-01 RX ORDER — ZINC OXIDE 20 %
OINTMENT (GRAM) TOPICAL PRN
Status: DISCONTINUED | OUTPATIENT
Start: 2021-12-01 | End: 2021-12-07 | Stop reason: HOSPADM

## 2021-12-01 RX ORDER — ZINC OXIDE 20 %
OINTMENT (GRAM) TOPICAL DAILY
Status: ON HOLD | COMMUNITY
End: 2021-12-07 | Stop reason: HOSPADM

## 2021-12-01 RX ORDER — FUROSEMIDE 20 MG/1
20 TABLET ORAL DAILY
Status: DISCONTINUED | OUTPATIENT
Start: 2021-12-02 | End: 2021-12-05

## 2021-12-01 RX ORDER — POTASSIUM CHLORIDE 10 MEQ
10 TABLET, EXT RELEASE, PARTICLES/CRYSTALS ORAL DAILY
COMMUNITY

## 2021-12-01 RX ORDER — DIPHENHYDRAMINE HCL 25 MG
25 CAPSULE ORAL EVERY 6 HOURS PRN
Status: DISCONTINUED | OUTPATIENT
Start: 2021-12-01 | End: 2021-12-07 | Stop reason: HOSPADM

## 2021-12-01 RX ORDER — NICOTINE POLACRILEX 4 MG
30 LOZENGE BUCCAL PRN
Status: DISCONTINUED | OUTPATIENT
Start: 2021-12-01 | End: 2021-12-07 | Stop reason: HOSPADM

## 2021-12-01 RX ORDER — 0.9 % SODIUM CHLORIDE 0.9 %
2 VIAL (ML) INJECTION EVERY 12 HOURS SCHEDULED
Status: DISCONTINUED | OUTPATIENT
Start: 2021-12-01 | End: 2021-12-07 | Stop reason: HOSPADM

## 2021-12-01 RX ORDER — PREDNISONE 5 MG/1
15 TABLET ORAL
Status: DISCONTINUED | OUTPATIENT
Start: 2021-12-02 | End: 2021-12-07 | Stop reason: HOSPADM

## 2021-12-01 RX ORDER — PREDNISONE 5 MG/1
10 TABLET ORAL EVERY EVENING
Status: ON HOLD | COMMUNITY
End: 2021-12-07 | Stop reason: HOSPADM

## 2021-12-01 RX ORDER — DEXTROSE MONOHYDRATE 25 G/50ML
12.5 INJECTION, SOLUTION INTRAVENOUS PRN
Status: DISCONTINUED | OUTPATIENT
Start: 2021-12-01 | End: 2021-12-07 | Stop reason: HOSPADM

## 2021-12-01 RX ORDER — AZATHIOPRINE 50 MG/1
75 TABLET ORAL DAILY
Status: DISCONTINUED | OUTPATIENT
Start: 2021-12-02 | End: 2021-12-07 | Stop reason: HOSPADM

## 2021-12-01 RX ORDER — DEXTROSE MONOHYDRATE 25 G/50ML
25 INJECTION, SOLUTION INTRAVENOUS PRN
Status: DISCONTINUED | OUTPATIENT
Start: 2021-12-01 | End: 2021-12-07 | Stop reason: HOSPADM

## 2021-12-01 RX ORDER — FLUOXETINE HYDROCHLORIDE 20 MG/1
20 CAPSULE ORAL DAILY
COMMUNITY

## 2021-12-01 RX ORDER — AZATHIOPRINE 50 MG/1
75 TABLET ORAL DAILY
COMMUNITY

## 2021-12-01 RX ORDER — TRAMADOL HYDROCHLORIDE 50 MG/1
50 TABLET ORAL EVERY 6 HOURS PRN
Status: DISCONTINUED | OUTPATIENT
Start: 2021-12-01 | End: 2021-12-07 | Stop reason: HOSPADM

## 2021-12-01 RX ORDER — TRIAMCINOLONE ACETONIDE 40 MG/ML
20 INJECTION, SUSPENSION INTRA-ARTICULAR; INTRAMUSCULAR ONCE
COMMUNITY
End: 2021-12-13

## 2021-12-01 RX ORDER — INSULIN GLARGINE 100 [IU]/ML
10 INJECTION, SOLUTION SUBCUTANEOUS
Status: ON HOLD | COMMUNITY
End: 2021-12-07 | Stop reason: HOSPADM

## 2021-12-01 RX ORDER — CARVEDILOL 25 MG/1
25 TABLET ORAL 2 TIMES DAILY WITH MEALS
Status: DISCONTINUED | OUTPATIENT
Start: 2021-12-02 | End: 2021-12-07

## 2021-12-01 RX ORDER — ALPRAZOLAM 0.25 MG/1
0.25 TABLET ORAL EVERY 12 HOURS PRN
COMMUNITY

## 2021-12-01 RX ORDER — ISOSORBIDE MONONITRATE 30 MG/1
60 TABLET, EXTENDED RELEASE ORAL DAILY
Status: DISCONTINUED | OUTPATIENT
Start: 2021-12-02 | End: 2021-12-07 | Stop reason: HOSPADM

## 2021-12-01 RX ORDER — INSULIN GLARGINE 100 [IU]/ML
6 INJECTION, SOLUTION SUBCUTANEOUS NIGHTLY
Status: DISCONTINUED | OUTPATIENT
Start: 2021-12-02 | End: 2021-12-07 | Stop reason: HOSPADM

## 2021-12-01 RX ORDER — PREDNISONE 20 MG/1
10 TABLET ORAL
Status: DISCONTINUED | OUTPATIENT
Start: 2021-12-02 | End: 2021-12-02

## 2021-12-01 RX ORDER — NICOTINE POLACRILEX 4 MG
15 LOZENGE BUCCAL PRN
Status: DISCONTINUED | OUTPATIENT
Start: 2021-12-01 | End: 2021-12-07 | Stop reason: HOSPADM

## 2021-12-01 RX ORDER — LOSARTAN POTASSIUM AND HYDROCHLOROTHIAZIDE 12.5; 5 MG/1; MG/1
1 TABLET ORAL 2 TIMES DAILY
Status: ON HOLD | COMMUNITY
End: 2021-12-07 | Stop reason: HOSPADM

## 2021-12-01 RX ORDER — FLUOXETINE HYDROCHLORIDE 20 MG/1
20 CAPSULE ORAL DAILY
Status: DISCONTINUED | OUTPATIENT
Start: 2021-12-02 | End: 2021-12-07 | Stop reason: HOSPADM

## 2021-12-01 RX ORDER — ALPRAZOLAM 0.25 MG/1
0.25 TABLET ORAL 2 TIMES DAILY PRN
Status: DISCONTINUED | OUTPATIENT
Start: 2021-12-01 | End: 2021-12-07 | Stop reason: HOSPADM

## 2021-12-01 RX ORDER — ZINC OXIDE 20 %
OINTMENT (GRAM) TOPICAL PRN
COMMUNITY

## 2021-12-01 RX ORDER — ACETAMINOPHEN 325 MG/1
650 TABLET ORAL EVERY 4 HOURS PRN
Status: DISCONTINUED | OUTPATIENT
Start: 2021-12-01 | End: 2021-12-07 | Stop reason: HOSPADM

## 2021-12-01 RX ORDER — INSULIN GLARGINE 100 [IU]/ML
6 INJECTION, SOLUTION SUBCUTANEOUS NIGHTLY
Status: DISCONTINUED | OUTPATIENT
Start: 2021-12-01 | End: 2021-12-01

## 2021-12-01 RX ORDER — SODIUM CHLORIDE 9 MG/ML
INJECTION, SOLUTION INTRAVENOUS CONTINUOUS PRN
Status: DISCONTINUED | OUTPATIENT
Start: 2021-12-01 | End: 2021-12-07 | Stop reason: HOSPADM

## 2021-12-01 RX ORDER — MENTHOL 40 MG/ML
GEL TOPICAL EVERY 12 HOURS
COMMUNITY

## 2021-12-01 RX ORDER — ONDANSETRON 2 MG/ML
4 INJECTION INTRAMUSCULAR; INTRAVENOUS 2 TIMES DAILY PRN
Status: DISCONTINUED | OUTPATIENT
Start: 2021-12-01 | End: 2021-12-07 | Stop reason: HOSPADM

## 2021-12-01 RX ORDER — FUROSEMIDE 20 MG/1
20 TABLET ORAL DAILY
COMMUNITY

## 2021-12-01 RX ADMIN — MAGNESIUM SULFATE HEPTAHYDRATE 2 G: 40 INJECTION, SOLUTION INTRAVENOUS at 18:30

## 2021-12-01 RX ADMIN — SODIUM CHLORIDE, POTASSIUM CHLORIDE, SODIUM LACTATE AND CALCIUM CHLORIDE 1000 ML: 600; 310; 30; 20 INJECTION, SOLUTION INTRAVENOUS at 18:30

## 2021-12-01 RX ADMIN — PROTHROMBIN, COAGULATION FACTOR VII HUMAN, COAGULATION FACTOR IX HUMAN, COAGULATION FACTOR X HUMAN, PROTEIN C, PROTEIN S HUMAN, AND WATER 1644 UNITS: KIT at 19:30

## 2021-12-01 RX ADMIN — IOHEXOL 75 ML: 350 INJECTION, SOLUTION INTRAVENOUS at 19:03

## 2021-12-01 RX ADMIN — PIPERACILLIN SODIUM AND TAZOBACTAM SODIUM 4.5 G: 4; .5 INJECTION, POWDER, LYOPHILIZED, FOR SOLUTION INTRAVENOUS at 19:23

## 2021-12-01 ASSESSMENT — COGNITIVE AND FUNCTIONAL STATUS - GENERAL
DO YOU HAVE DIFFICULTY DRESSING OR BATHING: YES
BECAUSE OF A PHYSICAL, MENTAL, OR EMOTIONAL CONDITION, DO YOU HAVE SERIOUS DIFFICULTY CONCENTRATING, REMEMBERING OR MAKING DECISIONS: YES
ARE YOU DEAF OR DO YOU HAVE SERIOUS DIFFICULTY  HEARING: NO
BECAUSE OF A PHYSICAL, MENTAL, OR EMOTIONAL CONDITION, DO YOU HAVE DIFFICULTY DOING ERRANDS ALONE: YES
ARE YOU BLIND OR DO YOU HAVE SERIOUS DIFFICULTY SEEING, EVEN WHEN WEARING GLASSES: NO
DO YOU HAVE SERIOUS DIFFICULTY WALKING OR CLIMBING STAIRS: YES

## 2021-12-01 ASSESSMENT — ACTIVITIES OF DAILY LIVING (ADL)
MOBILITY_ASSIST_DEVICES: SLIDE BOARD/SHEET;OTHER (COMMENT)
CHRONIC_PAIN_PRESENT: NO
FEEDING YOURSELF: NEEDS ASSISTANCE
TOILETING: DEPENDENT
ADL_SCORE: 1
CONTINENCE: DEPENDENT
ADL_SHORT_OF_BREATH: NO
TRANSFERRING: DEPENDENT
ADL_BEFORE_ADMISSION: NEEDS/REQUIRES ASSISTANCE
BATHING: DEPENDENT
DRESSING YOURSELF: DEPENDENT
RECENT_DECLINE_ADL: YES, ACUTE ILLNESS WITHOUT THERAPY NEEDS

## 2021-12-01 ASSESSMENT — LIFESTYLE VARIABLES
HOW MANY STANDARD DRINKS CONTAINING ALCOHOL DO YOU HAVE ON A TYPICAL DAY: 0,1 OR 2
ALCOHOL_USE_STATUS: NO OR LOW RISK WITH VALIDATED TOOL
AUDIT-C TOTAL SCORE: 0
HOW OFTEN DO YOU HAVE 6 OR MORE DRINKS ON ONE OCCASION: NEVER
HOW OFTEN DO YOU HAVE A DRINK CONTAINING ALCOHOL: NEVER

## 2021-12-02 PROBLEM — L89.150 PRESSURE INJURY OF SACRAL REGION, UNSTAGEABLE (CMD): Status: ACTIVE | Noted: 2021-12-01

## 2021-12-02 PROBLEM — D62 ACUTE BLOOD LOSS ANEMIA: Status: ACTIVE | Noted: 2021-12-02

## 2021-12-02 LAB
ANION GAP SERPL CALC-SCNC: 12 MMOL/L (ref 10–20)
ATRIAL RATE (BPM): 62
BACTERIA UR CULT: NORMAL
BASOPHILS # BLD: 0 K/MCL (ref 0–0.3)
BASOPHILS NFR BLD: 0 %
BUN SERPL-MCNC: 54 MG/DL (ref 6–20)
BUN/CREAT SERPL: 63 (ref 7–25)
CALCIUM SERPL-MCNC: 7.6 MG/DL (ref 8.4–10.2)
CHLORIDE SERPL-SCNC: 94 MMOL/L (ref 98–107)
CO2 SERPL-SCNC: 31 MMOL/L (ref 21–32)
CREAT SERPL-MCNC: 0.86 MG/DL (ref 0.51–0.95)
DEPRECATED RDW RBC: 52.6 FL (ref 39–50)
EOSINOPHIL # BLD: 0 K/MCL (ref 0–0.5)
EOSINOPHIL NFR BLD: 0 %
ERYTHROCYTE [DISTWIDTH] IN BLOOD: 15.7 % (ref 11–15)
FASTING DURATION TIME PATIENT: ABNORMAL H
GFR SERPLBLD BASED ON 1.73 SQ M-ARVRAT: 61 ML/MIN
GLUCOSE BLDC GLUCOMTR-MCNC: 131 MG/DL (ref 70–99)
GLUCOSE BLDC GLUCOMTR-MCNC: 172 MG/DL (ref 70–99)
GLUCOSE BLDC GLUCOMTR-MCNC: 84 MG/DL (ref 70–99)
GLUCOSE BLDC GLUCOMTR-MCNC: 84 MG/DL (ref 70–99)
GLUCOSE SERPL-MCNC: 89 MG/DL (ref 70–99)
HCT VFR BLD CALC: 24.5 % (ref 36–46.5)
HCT VFR BLD CALC: 24.7 % (ref 36–46.5)
HGB BLD-MCNC: 8.1 G/DL (ref 12–15.5)
HGB BLD-MCNC: 8.2 G/DL (ref 12–15.5)
LYMPHOCYTES # BLD: 0.3 K/MCL (ref 1–4)
LYMPHOCYTES NFR BLD: 3 %
MCH RBC QN AUTO: 30.5 PG (ref 26–34)
MCHC RBC AUTO-ENTMCNC: 33.1 G/DL (ref 32–36.5)
MCV RBC AUTO: 92.1 FL (ref 78–100)
MONOCYTES # BLD: 0.2 K/MCL (ref 0.3–0.9)
MONOCYTES NFR BLD: 2 %
NEUTROPHILS # BLD: 10.1 K/MCL (ref 1.8–7.7)
NEUTS BAND NFR BLD: 16 % (ref 0–10)
NEUTS SEG NFR BLD: 79 %
NRBC BLD MANUAL-RTO: 0 /100 WBC
P AXIS (DEGREES): 31
PLAT MORPH BLD: NORMAL
PLATELET # BLD AUTO: 112 K/MCL (ref 140–450)
POTASSIUM SERPL-SCNC: 4.2 MMOL/L (ref 3.4–5.1)
PR-INTERVAL (MSEC): 138
QRS-INTERVAL (MSEC): 96
QT-INTERVAL (MSEC): 442
QTC: 449
R AXIS (DEGREES): -5
RAINBOW EXTRA TUBES HOLD SPECIMEN: NORMAL
RBC # BLD: 2.66 MIL/MCL (ref 4–5.2)
RBC MORPH BLD: NORMAL
REPORT TEXT: NORMAL
SODIUM SERPL-SCNC: 133 MMOL/L (ref 135–145)
T AXIS (DEGREES): 48
TOXIC GRANULES BLD QL SMEAR: PRESENT
VENTRICULAR RATE EKG/MIN (BPM): 62
WBC # BLD: 10.6 K/MCL (ref 4.2–11)

## 2021-12-02 PROCEDURE — 10003585 HB ROOM CHARGE INTERMEDIATE CARE

## 2021-12-02 PROCEDURE — 87040 BLOOD CULTURE FOR BACTERIA: CPT | Performed by: INTERNAL MEDICINE

## 2021-12-02 PROCEDURE — 85027 COMPLETE CBC AUTOMATED: CPT | Performed by: INTERNAL MEDICINE

## 2021-12-02 PROCEDURE — 10002800 HB RX 250 W HCPCS: Performed by: INTERNAL MEDICINE

## 2021-12-02 PROCEDURE — 80048 BASIC METABOLIC PNL TOTAL CA: CPT | Performed by: INTERNAL MEDICINE

## 2021-12-02 PROCEDURE — 10002807 HB RX 258: Performed by: INTERNAL MEDICINE

## 2021-12-02 PROCEDURE — 13003289 HB OXYGEN THERAPY DAILY

## 2021-12-02 PROCEDURE — 36415 COLL VENOUS BLD VENIPUNCTURE: CPT | Performed by: INTERNAL MEDICINE

## 2021-12-02 PROCEDURE — 10004651 HB RX, NO CHARGE ITEM: Performed by: INTERNAL MEDICINE

## 2021-12-02 PROCEDURE — 10002801 HB RX 250 W/O HCPCS: Performed by: INTERNAL MEDICINE

## 2021-12-02 PROCEDURE — 99233 SBSQ HOSP IP/OBS HIGH 50: CPT | Performed by: INTERNAL MEDICINE

## 2021-12-02 PROCEDURE — 10002803 HB RX 637: Performed by: INTERNAL MEDICINE

## 2021-12-02 PROCEDURE — 85014 HEMATOCRIT: CPT | Performed by: INTERNAL MEDICINE

## 2021-12-02 RX ORDER — DEXTROSE AND SODIUM CHLORIDE 5; .45 G/100ML; G/100ML
INJECTION, SOLUTION INTRAVENOUS CONTINUOUS
Status: DISCONTINUED | OUTPATIENT
Start: 2021-12-02 | End: 2021-12-05

## 2021-12-02 RX ORDER — PREDNISONE 5 MG/1
5 TABLET ORAL
Status: DISCONTINUED | OUTPATIENT
Start: 2021-12-03 | End: 2021-12-07 | Stop reason: HOSPADM

## 2021-12-02 RX ADMIN — SODIUM CHLORIDE, PRESERVATIVE FREE 2 ML: 5 INJECTION INTRAVENOUS at 21:46

## 2021-12-02 RX ADMIN — FLUOXETINE 20 MG: 20 CAPSULE ORAL at 09:04

## 2021-12-02 RX ADMIN — DEXTROSE AND SODIUM CHLORIDE: 5; 450 INJECTION, SOLUTION INTRAVENOUS at 13:32

## 2021-12-02 RX ADMIN — SODIUM CHLORIDE, PRESERVATIVE FREE 2 ML: 5 INJECTION INTRAVENOUS at 00:46

## 2021-12-02 RX ADMIN — PIPERACILLIN SODIUM AND TAZOBACTAM SODIUM 3.38 G: 3; .375 INJECTION, POWDER, LYOPHILIZED, FOR SOLUTION INTRAVENOUS at 13:35

## 2021-12-02 RX ADMIN — PREDNISONE 10 MG: 20 TABLET ORAL at 17:22

## 2021-12-02 RX ADMIN — AZATHIOPRINE 75 MG: 50 TABLET ORAL at 09:03

## 2021-12-02 RX ADMIN — INSULIN GLARGINE 6 UNITS: 100 INJECTION, SOLUTION SUBCUTANEOUS at 21:41

## 2021-12-02 RX ADMIN — PIPERACILLIN SODIUM AND TAZOBACTAM SODIUM 3.38 G: 3; .375 INJECTION, POWDER, LYOPHILIZED, FOR SOLUTION INTRAVENOUS at 00:50

## 2021-12-02 RX ADMIN — PREDNISONE 15 MG: 5 TABLET ORAL at 10:43

## 2021-12-02 RX ADMIN — SODIUM CHLORIDE, PRESERVATIVE FREE 2 ML: 5 INJECTION INTRAVENOUS at 09:08

## 2021-12-02 RX ADMIN — PIPERACILLIN SODIUM AND TAZOBACTAM SODIUM 3.38 G: 3; .375 INJECTION, POWDER, LYOPHILIZED, FOR SOLUTION INTRAVENOUS at 21:46

## 2021-12-02 RX ADMIN — INSULIN LISPRO 1 UNITS: 100 INJECTION, SOLUTION INTRAVENOUS; SUBCUTANEOUS at 21:41

## 2021-12-02 RX ADMIN — PIPERACILLIN SODIUM AND TAZOBACTAM SODIUM 3.38 G: 3; .375 INJECTION, POWDER, LYOPHILIZED, FOR SOLUTION INTRAVENOUS at 05:24

## 2021-12-02 RX ADMIN — ACETAMINOPHEN 650 MG: 325 TABLET, FILM COATED ORAL at 09:04

## 2021-12-02 ASSESSMENT — MOVEMENT AND STRENGTH ASSESSMENTS
LUE_ASSESSMENT: GOOD/COMPLETE ROM AGAINST GRAVITY, SOME ADDED RESISTANCE
LLE_ASSESSMENT: TRACE/PALPABLE CONTRACTION OF MUSCLE, NO JOINT MOTION
RUE_ASSESSMENT: GOOD/COMPLETE ROM AGAINST GRAVITY, SOME ADDED RESISTANCE
RLE_ASSESSMENT: TRACE/PALPABLE CONTRACTION OF MUSCLE, NO JOINT MOTION
LLE_ASSESSMENT: TRACE/PALPABLE CONTRACTION OF MUSCLE, NO JOINT MOTION
LLE_ASSESSMENT: TRACE/PALPABLE CONTRACTION OF MUSCLE, NO JOINT MOTION
LUE_ASSESSMENT: GOOD/COMPLETE ROM AGAINST GRAVITY, SOME ADDED RESISTANCE
RLE_ASSESSMENT: TRACE/PALPABLE CONTRACTION OF MUSCLE, NO JOINT MOTION
RUE_ASSESSMENT: GOOD/COMPLETE ROM AGAINST GRAVITY, SOME ADDED RESISTANCE
RUE_ASSESSMENT: GOOD/COMPLETE ROM AGAINST GRAVITY, SOME ADDED RESISTANCE
LUE_ASSESSMENT: GOOD/COMPLETE ROM AGAINST GRAVITY, SOME ADDED RESISTANCE
RLE_ASSESSMENT: TRACE/PALPABLE CONTRACTION OF MUSCLE, NO JOINT MOTION

## 2021-12-02 ASSESSMENT — PAIN SCALES - PAIN ASSESSMENT IN ADVANCED DEMENTIA (PAINAD)
CONSOLABILITY: NO NEED TO CONSOLE
BODYLANGUAGE: RELAXED
TOTALSCORE: 0
FACIALEXPRESSION: SMILING OR INEXPRESSIVE
BREATHING: NORMAL

## 2021-12-03 ENCOUNTER — TELEPHONE (OUTPATIENT)
Dept: SCHEDULING | Age: 86
End: 2021-12-03

## 2021-12-03 PROBLEM — D69.6 THROMBOCYTOPENIA (CMD): Status: ACTIVE | Noted: 2021-12-03

## 2021-12-03 PROBLEM — K74.69 OTHER CIRRHOSIS OF LIVER (CMD): Status: ACTIVE | Noted: 2021-12-03

## 2021-12-03 PROBLEM — N39.0 SEPSIS DUE TO GRAM-NEGATIVE UTI (CMD): Status: ACTIVE | Noted: 2021-12-03

## 2021-12-03 PROBLEM — A41.50 SEPSIS DUE TO GRAM-NEGATIVE UTI (CMD): Status: ACTIVE | Noted: 2021-12-03

## 2021-12-03 LAB
ALBUMIN SERPL-MCNC: 1.2 G/DL (ref 3.6–5.1)
ALBUMIN/GLOB SERPL: 0.4 {RATIO} (ref 1–2.4)
ALP SERPL-CCNC: 274 UNITS/L (ref 45–117)
ALT SERPL-CCNC: 39 UNITS/L
ANION GAP SERPL CALC-SCNC: 12 MMOL/L (ref 10–20)
AST SERPL-CCNC: 30 UNITS/L
BACTERIA BLD CULT: ABNORMAL
BASOPHILS # BLD: 0 K/MCL (ref 0–0.3)
BASOPHILS NFR BLD: 0 %
BILIRUB SERPL-MCNC: 2.3 MG/DL (ref 0.2–1)
BUN SERPL-MCNC: 57 MG/DL (ref 6–20)
BUN/CREAT SERPL: 63 (ref 7–25)
CALCIUM SERPL-MCNC: 7.5 MG/DL (ref 8.4–10.2)
CHLORIDE SERPL-SCNC: 95 MMOL/L (ref 98–107)
CO2 SERPL-SCNC: 29 MMOL/L (ref 21–32)
CREAT SERPL-MCNC: 0.91 MG/DL (ref 0.51–0.95)
DEPRECATED RDW RBC: 51.8 FL (ref 39–50)
EOSINOPHIL # BLD: 0 K/MCL (ref 0–0.5)
EOSINOPHIL NFR BLD: 0 %
ERYTHROCYTE [DISTWIDTH] IN BLOOD: 15.4 % (ref 11–15)
FASTING DURATION TIME PATIENT: ABNORMAL H
GFR SERPLBLD BASED ON 1.73 SQ M-ARVRAT: 57 ML/MIN
GLOBULIN SER-MCNC: 2.7 G/DL (ref 2–4)
GLUCOSE BLDC GLUCOMTR-MCNC: 170 MG/DL (ref 70–99)
GLUCOSE BLDC GLUCOMTR-MCNC: 184 MG/DL (ref 70–99)
GLUCOSE BLDC GLUCOMTR-MCNC: 197 MG/DL (ref 70–99)
GLUCOSE BLDC GLUCOMTR-MCNC: 219 MG/DL (ref 70–99)
GLUCOSE SERPL-MCNC: 196 MG/DL (ref 70–99)
GRAM STN SPEC: ABNORMAL
HCT VFR BLD CALC: 23.8 % (ref 36–46.5)
HEMOCCULT STL QL: POSITIVE
HGB BLD-MCNC: 7.9 G/DL (ref 12–15.5)
LYMPHOCYTES # BLD: 0.4 K/MCL (ref 1–4)
LYMPHOCYTES NFR BLD: 4 %
MCH RBC QN AUTO: 30.4 PG (ref 26–34)
MCHC RBC AUTO-ENTMCNC: 33.2 G/DL (ref 32–36.5)
MCV RBC AUTO: 91.5 FL (ref 78–100)
MONOCYTES # BLD: 0.4 K/MCL (ref 0.3–0.9)
MONOCYTES NFR BLD: 5 %
NEUTROPHILS # BLD: 7 K/MCL (ref 1.8–7.7)
NEUTS BAND NFR BLD: 10 % (ref 0–10)
NEUTS SEG NFR BLD: 80 %
NRBC BLD MANUAL-RTO: 0 /100 WBC
PLAT MORPH BLD: NORMAL
PLATELET # BLD AUTO: 98 K/MCL (ref 140–450)
POTASSIUM SERPL-SCNC: 3.5 MMOL/L (ref 3.4–5.1)
POTASSIUM SERPL-SCNC: 4 MMOL/L (ref 3.4–5.1)
PROT SERPL-MCNC: 3.9 G/DL (ref 6.4–8.2)
RBC # BLD: 2.6 MIL/MCL (ref 4–5.2)
SODIUM SERPL-SCNC: 132 MMOL/L (ref 135–145)
TARGETS BLD QL SMEAR: ABNORMAL
VARIANT LYMPHS NFR BLD: 1 % (ref 0–5)
WBC # BLD: 7.8 K/MCL (ref 4.2–11)
WBC TOXIC VACUOLES BLD QL SMEAR: PRESENT

## 2021-12-03 PROCEDURE — 80053 COMPREHEN METABOLIC PANEL: CPT | Performed by: INTERNAL MEDICINE

## 2021-12-03 PROCEDURE — 10002803 HB RX 637: Performed by: INTERNAL MEDICINE

## 2021-12-03 PROCEDURE — 10003585 HB ROOM CHARGE INTERMEDIATE CARE

## 2021-12-03 PROCEDURE — 85027 COMPLETE CBC AUTOMATED: CPT | Performed by: INTERNAL MEDICINE

## 2021-12-03 PROCEDURE — 10002807 HB RX 258: Performed by: INTERNAL MEDICINE

## 2021-12-03 PROCEDURE — 36415 COLL VENOUS BLD VENIPUNCTURE: CPT | Performed by: INTERNAL MEDICINE

## 2021-12-03 PROCEDURE — 82270 OCCULT BLOOD FECES: CPT | Performed by: INTERNAL MEDICINE

## 2021-12-03 PROCEDURE — 10004651 HB RX, NO CHARGE ITEM: Performed by: INTERNAL MEDICINE

## 2021-12-03 PROCEDURE — 10002801 HB RX 250 W/O HCPCS: Performed by: INTERNAL MEDICINE

## 2021-12-03 PROCEDURE — 99233 SBSQ HOSP IP/OBS HIGH 50: CPT | Performed by: INTERNAL MEDICINE

## 2021-12-03 PROCEDURE — 10002800 HB RX 250 W HCPCS: Performed by: INTERNAL MEDICINE

## 2021-12-03 PROCEDURE — 84132 ASSAY OF SERUM POTASSIUM: CPT | Performed by: INTERNAL MEDICINE

## 2021-12-03 RX ORDER — POTASSIUM CHLORIDE 20 MEQ/1
40 TABLET, EXTENDED RELEASE ORAL ONCE
Status: COMPLETED | OUTPATIENT
Start: 2021-12-03 | End: 2021-12-03

## 2021-12-03 RX ADMIN — AZATHIOPRINE 75 MG: 50 TABLET ORAL at 09:12

## 2021-12-03 RX ADMIN — INSULIN LISPRO 2 UNITS: 100 INJECTION, SOLUTION INTRAVENOUS; SUBCUTANEOUS at 22:41

## 2021-12-03 RX ADMIN — PREDNISONE 15 MG: 5 TABLET ORAL at 08:57

## 2021-12-03 RX ADMIN — PIPERACILLIN SODIUM AND TAZOBACTAM SODIUM 3.38 G: 3; .375 INJECTION, POWDER, LYOPHILIZED, FOR SOLUTION INTRAVENOUS at 12:51

## 2021-12-03 RX ADMIN — INSULIN LISPRO 1 UNITS: 100 INJECTION, SOLUTION INTRAVENOUS; SUBCUTANEOUS at 12:48

## 2021-12-03 RX ADMIN — INSULIN LISPRO 1 UNITS: 100 INJECTION, SOLUTION INTRAVENOUS; SUBCUTANEOUS at 09:10

## 2021-12-03 RX ADMIN — SODIUM CHLORIDE, PRESERVATIVE FREE 2 ML: 5 INJECTION INTRAVENOUS at 22:53

## 2021-12-03 RX ADMIN — DEXTROSE AND SODIUM CHLORIDE: 5; 450 INJECTION, SOLUTION INTRAVENOUS at 02:21

## 2021-12-03 RX ADMIN — INSULIN GLARGINE 6 UNITS: 100 INJECTION, SOLUTION SUBCUTANEOUS at 22:40

## 2021-12-03 RX ADMIN — PREDNISONE 5 MG: 5 TABLET ORAL at 17:35

## 2021-12-03 RX ADMIN — POTASSIUM CHLORIDE 40 MEQ: 1500 TABLET, EXTENDED RELEASE ORAL at 09:01

## 2021-12-03 RX ADMIN — POTASSIUM CHLORIDE 40 MEQ: 20 TABLET, EXTENDED RELEASE ORAL at 22:52

## 2021-12-03 RX ADMIN — PIPERACILLIN SODIUM AND TAZOBACTAM SODIUM 3.38 G: 3; .375 INJECTION, POWDER, LYOPHILIZED, FOR SOLUTION INTRAVENOUS at 22:56

## 2021-12-03 RX ADMIN — FLUOXETINE 20 MG: 20 CAPSULE ORAL at 08:58

## 2021-12-03 RX ADMIN — INSULIN LISPRO 1 UNITS: 100 INJECTION, SOLUTION INTRAVENOUS; SUBCUTANEOUS at 17:34

## 2021-12-03 ASSESSMENT — PAIN SCALES - PAIN ASSESSMENT IN ADVANCED DEMENTIA (PAINAD)
TOTALSCORE: 0
FACIALEXPRESSION: SMILING OR INEXPRESSIVE
BODYLANGUAGE: RELAXED
FACIALEXPRESSION: SMILING OR INEXPRESSIVE
TOTALSCORE: 0
BODYLANGUAGE: RELAXED
CONSOLABILITY: NO NEED TO CONSOLE
BODYLANGUAGE: RELAXED
CONSOLABILITY: NO NEED TO CONSOLE
TOTALSCORE: 0
BODYLANGUAGE: RELAXED
FACIALEXPRESSION: SMILING OR INEXPRESSIVE
BREATHING: NORMAL
BREATHING: NORMAL
CONSOLABILITY: NO NEED TO CONSOLE
BREATHING: NORMAL
FACIALEXPRESSION: SMILING OR INEXPRESSIVE
CONSOLABILITY: NO NEED TO CONSOLE
FACIALEXPRESSION: SMILING OR INEXPRESSIVE
TOTALSCORE: 0
CONSOLABILITY: NO NEED TO CONSOLE
TOTALSCORE: 0
BREATHING: NORMAL
CONSOLABILITY: NO NEED TO CONSOLE
BREATHING: NORMAL
BODYLANGUAGE: RELAXED
BREATHING: NORMAL
FACIALEXPRESSION: SMILING OR INEXPRESSIVE
BODYLANGUAGE: RELAXED
TOTALSCORE: 0

## 2021-12-03 ASSESSMENT — MOVEMENT AND STRENGTH ASSESSMENTS
LLE_ASSESSMENT: TRACE/PALPABLE CONTRACTION OF MUSCLE, NO JOINT MOTION
RUE_ASSESSMENT: GOOD/COMPLETE ROM AGAINST GRAVITY, SOME ADDED RESISTANCE
RLE_ASSESSMENT: TRACE/PALPABLE CONTRACTION OF MUSCLE, NO JOINT MOTION
LUE_ASSESSMENT: GOOD/COMPLETE ROM AGAINST GRAVITY, SOME ADDED RESISTANCE

## 2021-12-04 ENCOUNTER — ANESTHESIA EVENT (OUTPATIENT)
Dept: GASTROENTEROLOGY | Age: 86
DRG: 871 | End: 2021-12-04

## 2021-12-04 ENCOUNTER — ANESTHESIA (OUTPATIENT)
Dept: GASTROENTEROLOGY | Age: 86
DRG: 871 | End: 2021-12-04

## 2021-12-04 ENCOUNTER — APPOINTMENT (OUTPATIENT)
Dept: GASTROENTEROLOGY | Age: 86
DRG: 871 | End: 2021-12-04
Attending: INTERNAL MEDICINE

## 2021-12-04 LAB
ALBUMIN SERPL-MCNC: 1.3 G/DL (ref 3.6–5.1)
ALBUMIN/GLOB SERPL: 0.4 {RATIO} (ref 1–2.4)
ALP SERPL-CCNC: 301 UNITS/L (ref 45–117)
ALT SERPL-CCNC: 43 UNITS/L
ANION GAP SERPL CALC-SCNC: 9 MMOL/L (ref 10–20)
AST SERPL-CCNC: 28 UNITS/L
BASOPHILS # BLD: 0 K/MCL (ref 0–0.3)
BASOPHILS NFR BLD: 1 %
BILIRUB SERPL-MCNC: 2.2 MG/DL (ref 0.2–1)
BUN SERPL-MCNC: 53 MG/DL (ref 6–20)
BUN/CREAT SERPL: 62 (ref 7–25)
CALCIUM SERPL-MCNC: 7.8 MG/DL (ref 8.4–10.2)
CHLORIDE SERPL-SCNC: 98 MMOL/L (ref 98–107)
CO2 SERPL-SCNC: 26 MMOL/L (ref 21–32)
CREAT SERPL-MCNC: 0.85 MG/DL (ref 0.51–0.95)
DEPRECATED RDW RBC: 50.1 FL (ref 39–50)
EOSINOPHIL # BLD: 0 K/MCL (ref 0–0.5)
EOSINOPHIL NFR BLD: 0 %
ERYTHROCYTE [DISTWIDTH] IN BLOOD: 15.2 % (ref 11–15)
FASTING DURATION TIME PATIENT: ABNORMAL H
GFR SERPLBLD BASED ON 1.73 SQ M-ARVRAT: 62 ML/MIN
GLOBULIN SER-MCNC: 3.3 G/DL (ref 2–4)
GLUCOSE BLDC GLUCOMTR-MCNC: 189 MG/DL (ref 70–99)
GLUCOSE BLDC GLUCOMTR-MCNC: 193 MG/DL (ref 70–99)
GLUCOSE BLDC GLUCOMTR-MCNC: 199 MG/DL (ref 70–99)
GLUCOSE BLDC GLUCOMTR-MCNC: 202 MG/DL (ref 70–99)
GLUCOSE BLDC GLUCOMTR-MCNC: 266 MG/DL (ref 70–99)
GLUCOSE SERPL-MCNC: 222 MG/DL (ref 70–99)
HCT VFR BLD CALC: 26.9 % (ref 36–46.5)
HGB BLD-MCNC: 8.9 G/DL (ref 12–15.5)
IMM GRANULOCYTES # BLD AUTO: 0.1 K/MCL (ref 0–0.2)
IMM GRANULOCYTES # BLD: 2 %
LYMPHOCYTES # BLD: 0.5 K/MCL (ref 1–4)
LYMPHOCYTES NFR BLD: 6 %
MCH RBC QN AUTO: 29.6 PG (ref 26–34)
MCHC RBC AUTO-ENTMCNC: 33.1 G/DL (ref 32–36.5)
MCV RBC AUTO: 89.4 FL (ref 78–100)
MONOCYTES # BLD: 0.3 K/MCL (ref 0.3–0.9)
MONOCYTES NFR BLD: 3 %
NEUTROPHILS # BLD: 7.7 K/MCL (ref 1.8–7.7)
NEUTROPHILS NFR BLD: 88 %
NRBC BLD MANUAL-RTO: 0 /100 WBC
PLATELET # BLD AUTO: 128 K/MCL (ref 140–450)
POTASSIUM SERPL-SCNC: 4.3 MMOL/L (ref 3.4–5.1)
PROT SERPL-MCNC: 4.6 G/DL (ref 6.4–8.2)
RBC # BLD: 3.01 MIL/MCL (ref 4–5.2)
SODIUM SERPL-SCNC: 129 MMOL/L (ref 135–145)
WBC # BLD: 8.7 K/MCL (ref 4.2–11)

## 2021-12-04 PROCEDURE — 36415 COLL VENOUS BLD VENIPUNCTURE: CPT | Performed by: INTERNAL MEDICINE

## 2021-12-04 PROCEDURE — 13000008 HB ANESTHESIA MAC OUTSIDE OR

## 2021-12-04 PROCEDURE — 85025 COMPLETE CBC W/AUTO DIFF WBC: CPT | Performed by: INTERNAL MEDICINE

## 2021-12-04 PROCEDURE — 0DB78ZX EXCISION OF STOMACH, PYLORUS, VIA NATURAL OR ARTIFICIAL OPENING ENDOSCOPIC, DIAGNOSTIC: ICD-10-PCS | Performed by: INTERNAL MEDICINE

## 2021-12-04 PROCEDURE — 80053 COMPREHEN METABOLIC PANEL: CPT | Performed by: INTERNAL MEDICINE

## 2021-12-04 PROCEDURE — 13003289 HB OXYGEN THERAPY DAILY

## 2021-12-04 PROCEDURE — 0DB68ZX EXCISION OF STOMACH, VIA NATURAL OR ARTIFICIAL OPENING ENDOSCOPIC, DIAGNOSTIC: ICD-10-PCS | Performed by: INTERNAL MEDICINE

## 2021-12-04 PROCEDURE — 13000001 HB PHASE II RECOVERY EA 30 MINUTES

## 2021-12-04 PROCEDURE — 10002801 HB RX 250 W/O HCPCS: Performed by: INTERNAL MEDICINE

## 2021-12-04 PROCEDURE — 10002801 HB RX 250 W/O HCPCS: Performed by: ANESTHESIOLOGY

## 2021-12-04 PROCEDURE — 10002803 HB RX 637: Performed by: INTERNAL MEDICINE

## 2021-12-04 PROCEDURE — 10003585 HB ROOM CHARGE INTERMEDIATE CARE

## 2021-12-04 PROCEDURE — 10002800 HB RX 250 W HCPCS: Performed by: INTERNAL MEDICINE

## 2021-12-04 PROCEDURE — 10002800 HB RX 250 W HCPCS: Performed by: ANESTHESIOLOGY

## 2021-12-04 PROCEDURE — 10002807 HB RX 258: Performed by: INTERNAL MEDICINE

## 2021-12-04 PROCEDURE — 13000024 HB GI COMPLEX CASE S/U + 1ST 15 MIN

## 2021-12-04 PROCEDURE — 10004451 HB PACU RECOVERY 1ST 30 MINUTES

## 2021-12-04 PROCEDURE — 10002807 HB RX 258: Performed by: EMERGENCY MEDICINE

## 2021-12-04 PROCEDURE — 88342 IMHCHEM/IMCYTCHM 1ST ANTB: CPT | Performed by: INTERNAL MEDICINE

## 2021-12-04 PROCEDURE — 99233 SBSQ HOSP IP/OBS HIGH 50: CPT | Performed by: INTERNAL MEDICINE

## 2021-12-04 PROCEDURE — 10004651 HB RX, NO CHARGE ITEM: Performed by: INTERNAL MEDICINE

## 2021-12-04 RX ORDER — PANTOPRAZOLE SODIUM 40 MG/1
40 TABLET, DELAYED RELEASE ORAL
Status: DISCONTINUED | OUTPATIENT
Start: 2021-12-04 | End: 2021-12-07 | Stop reason: HOSPADM

## 2021-12-04 RX ORDER — PROPOFOL 10 MG/ML
INJECTION, EMULSION INTRAVENOUS PRN
Status: DISCONTINUED | OUTPATIENT
Start: 2021-12-04 | End: 2021-12-04

## 2021-12-04 RX ORDER — ONDANSETRON 2 MG/ML
4 INJECTION INTRAMUSCULAR; INTRAVENOUS 2 TIMES DAILY PRN
Status: DISCONTINUED | OUTPATIENT
Start: 2021-12-04 | End: 2021-12-07 | Stop reason: HOSPADM

## 2021-12-04 RX ORDER — LIDOCAINE HYDROCHLORIDE 20 MG/ML
INJECTION, SOLUTION INFILTRATION; PERINEURAL PRN
Status: DISCONTINUED | OUTPATIENT
Start: 2021-12-04 | End: 2021-12-04

## 2021-12-04 RX ADMIN — PIPERACILLIN SODIUM AND TAZOBACTAM SODIUM 3.38 G: 3; .375 INJECTION, POWDER, LYOPHILIZED, FOR SOLUTION INTRAVENOUS at 13:21

## 2021-12-04 RX ADMIN — DEXTROSE AND SODIUM CHLORIDE: 5; 450 INJECTION, SOLUTION INTRAVENOUS at 05:34

## 2021-12-04 RX ADMIN — LIDOCAINE HYDROCHLORIDE 2 ML: 20 INJECTION, SOLUTION INFILTRATION; PERINEURAL at 10:22

## 2021-12-04 RX ADMIN — INSULIN LISPRO 1 UNITS: 100 INJECTION, SOLUTION INTRAVENOUS; SUBCUTANEOUS at 16:08

## 2021-12-04 RX ADMIN — Medication 200 MCG: at 10:39

## 2021-12-04 RX ADMIN — PIPERACILLIN SODIUM AND TAZOBACTAM SODIUM 3.38 G: 3; .375 INJECTION, POWDER, LYOPHILIZED, FOR SOLUTION INTRAVENOUS at 22:37

## 2021-12-04 RX ADMIN — INSULIN LISPRO 3 UNITS: 100 INJECTION, SOLUTION INTRAVENOUS; SUBCUTANEOUS at 22:29

## 2021-12-04 RX ADMIN — INSULIN LISPRO 1 UNITS: 100 INJECTION, SOLUTION INTRAVENOUS; SUBCUTANEOUS at 13:18

## 2021-12-04 RX ADMIN — PROPOFOL 30 MG: 10 INJECTION, EMULSION INTRAVENOUS at 10:22

## 2021-12-04 RX ADMIN — AZATHIOPRINE 75 MG: 50 TABLET ORAL at 16:07

## 2021-12-04 RX ADMIN — PROPOFOL 10 MG: 10 INJECTION, EMULSION INTRAVENOUS at 10:29

## 2021-12-04 RX ADMIN — SODIUM CHLORIDE, PRESERVATIVE FREE 2 ML: 5 INJECTION INTRAVENOUS at 08:00

## 2021-12-04 RX ADMIN — SODIUM CHLORIDE: 9 INJECTION, SOLUTION INTRAVENOUS at 10:20

## 2021-12-04 RX ADMIN — PIPERACILLIN SODIUM AND TAZOBACTAM SODIUM 3.38 G: 3; .375 INJECTION, POWDER, LYOPHILIZED, FOR SOLUTION INTRAVENOUS at 06:16

## 2021-12-04 RX ADMIN — Medication 100 MCG: at 10:32

## 2021-12-04 RX ADMIN — PANTOPRAZOLE SODIUM 40 MG: 40 TABLET, DELAYED RELEASE ORAL at 16:09

## 2021-12-04 RX ADMIN — INSULIN GLARGINE 6 UNITS: 100 INJECTION, SOLUTION SUBCUTANEOUS at 22:29

## 2021-12-04 SDOH — SOCIAL STABILITY: SOCIAL INSECURITY: RISK FACTORS: AGE

## 2021-12-04 SDOH — SOCIAL STABILITY: SOCIAL INSECURITY: RISK FACTORS: BMI> 30 (OBESITY)

## 2021-12-04 ASSESSMENT — PAIN SCALES - WONG BAKER
WONGBAKER_NUMERICALRESPONSE: 0

## 2021-12-04 ASSESSMENT — PAIN SCALES - PAIN ASSESSMENT IN ADVANCED DEMENTIA (PAINAD)
BREATHING: NORMAL
FACIALEXPRESSION: SMILING OR INEXPRESSIVE
FACIALEXPRESSION: SMILING OR INEXPRESSIVE
CONSOLABILITY: NO NEED TO CONSOLE
CONSOLABILITY: NO NEED TO CONSOLE
BREATHING: NORMAL
BODYLANGUAGE: RELAXED
FACIALEXPRESSION: SMILING OR INEXPRESSIVE
BODYLANGUAGE: RELAXED
TOTALSCORE: 0
BREATHING: NORMAL
TOTALSCORE: 0
BODYLANGUAGE: RELAXED
CONSOLABILITY: NO NEED TO CONSOLE
TOTALSCORE: 0

## 2021-12-05 LAB
ALBUMIN SERPL-MCNC: 1.3 G/DL (ref 3.6–5.1)
ALP SERPL-CCNC: 286 UNITS/L (ref 45–117)
ALT SERPL-CCNC: 42 UNITS/L
ANION GAP SERPL CALC-SCNC: 10 MMOL/L (ref 10–20)
AST SERPL-CCNC: 31 UNITS/L
BASOPHILS # BLD: 0 K/MCL (ref 0–0.3)
BASOPHILS NFR BLD: 0 %
BILIRUB CONJ SERPL-MCNC: 2 MG/DL (ref 0–0.2)
BILIRUB SERPL-MCNC: 2.1 MG/DL (ref 0.2–1)
BUN SERPL-MCNC: 46 MG/DL (ref 6–20)
BUN/CREAT SERPL: 54 (ref 7–25)
CALCIUM SERPL-MCNC: 7.6 MG/DL (ref 8.4–10.2)
CHLORIDE SERPL-SCNC: 100 MMOL/L (ref 98–107)
CO2 SERPL-SCNC: 27 MMOL/L (ref 21–32)
CREAT SERPL-MCNC: 0.85 MG/DL (ref 0.51–0.95)
DEPRECATED RDW RBC: 51.6 FL (ref 39–50)
EOSINOPHIL # BLD: 0 K/MCL (ref 0–0.5)
EOSINOPHIL NFR BLD: 0 %
ERYTHROCYTE [DISTWIDTH] IN BLOOD: 15.4 % (ref 11–15)
FASTING DURATION TIME PATIENT: ABNORMAL H
GFR SERPLBLD BASED ON 1.73 SQ M-ARVRAT: 62 ML/MIN
GLUCOSE BLDC GLUCOMTR-MCNC: 175 MG/DL (ref 70–99)
GLUCOSE BLDC GLUCOMTR-MCNC: 218 MG/DL (ref 70–99)
GLUCOSE BLDC GLUCOMTR-MCNC: 238 MG/DL (ref 70–99)
GLUCOSE BLDC GLUCOMTR-MCNC: 239 MG/DL (ref 70–99)
GLUCOSE SERPL-MCNC: 176 MG/DL (ref 70–99)
HCT VFR BLD CALC: 24.6 % (ref 36–46.5)
HGB BLD-MCNC: 8.1 G/DL (ref 12–15.5)
IMM GRANULOCYTES # BLD AUTO: 0.2 K/MCL (ref 0–0.2)
IMM GRANULOCYTES # BLD: 3 %
LYMPHOCYTES # BLD: 0.4 K/MCL (ref 1–4)
LYMPHOCYTES NFR BLD: 7 %
MCH RBC QN AUTO: 30 PG (ref 26–34)
MCHC RBC AUTO-ENTMCNC: 32.9 G/DL (ref 32–36.5)
MCV RBC AUTO: 91.1 FL (ref 78–100)
MONOCYTES # BLD: 0.3 K/MCL (ref 0.3–0.9)
MONOCYTES NFR BLD: 5 %
NEUTROPHILS # BLD: 5.4 K/MCL (ref 1.8–7.7)
NEUTROPHILS NFR BLD: 85 %
NRBC BLD MANUAL-RTO: 0 /100 WBC
PLATELET # BLD AUTO: 120 K/MCL (ref 140–450)
POTASSIUM SERPL-SCNC: 3.4 MMOL/L (ref 3.4–5.1)
POTASSIUM SERPL-SCNC: 3.9 MMOL/L (ref 3.4–5.1)
PROT SERPL-MCNC: 4.2 G/DL (ref 6.4–8.2)
RBC # BLD: 2.7 MIL/MCL (ref 4–5.2)
SODIUM SERPL-SCNC: 134 MMOL/L (ref 135–145)
WBC # BLD: 6.3 K/MCL (ref 4.2–11)

## 2021-12-05 PROCEDURE — 36415 COLL VENOUS BLD VENIPUNCTURE: CPT | Performed by: INTERNAL MEDICINE

## 2021-12-05 PROCEDURE — 10003585 HB ROOM CHARGE INTERMEDIATE CARE

## 2021-12-05 PROCEDURE — 10002803 HB RX 637: Performed by: INTERNAL MEDICINE

## 2021-12-05 PROCEDURE — 99233 SBSQ HOSP IP/OBS HIGH 50: CPT | Performed by: INTERNAL MEDICINE

## 2021-12-05 PROCEDURE — 10002800 HB RX 250 W HCPCS: Performed by: INTERNAL MEDICINE

## 2021-12-05 PROCEDURE — 80048 BASIC METABOLIC PNL TOTAL CA: CPT | Performed by: INTERNAL MEDICINE

## 2021-12-05 PROCEDURE — 84132 ASSAY OF SERUM POTASSIUM: CPT | Performed by: INTERNAL MEDICINE

## 2021-12-05 PROCEDURE — 10002807 HB RX 258: Performed by: INTERNAL MEDICINE

## 2021-12-05 PROCEDURE — 85025 COMPLETE CBC W/AUTO DIFF WBC: CPT | Performed by: INTERNAL MEDICINE

## 2021-12-05 PROCEDURE — 80076 HEPATIC FUNCTION PANEL: CPT | Performed by: INTERNAL MEDICINE

## 2021-12-05 PROCEDURE — 10002801 HB RX 250 W/O HCPCS: Performed by: INTERNAL MEDICINE

## 2021-12-05 RX ORDER — POTASSIUM CHLORIDE 20 MEQ/1
40 TABLET, EXTENDED RELEASE ORAL ONCE
Status: COMPLETED | OUTPATIENT
Start: 2021-12-05 | End: 2021-12-05

## 2021-12-05 RX ORDER — FUROSEMIDE 20 MG/1
20 TABLET ORAL DAILY
Status: DISCONTINUED | OUTPATIENT
Start: 2021-12-05 | End: 2021-12-07 | Stop reason: HOSPADM

## 2021-12-05 RX ADMIN — POTASSIUM CHLORIDE 40 MEQ: 1500 TABLET, EXTENDED RELEASE ORAL at 22:15

## 2021-12-05 RX ADMIN — TRAMADOL HYDROCHLORIDE 50 MG: 50 TABLET ORAL at 14:05

## 2021-12-05 RX ADMIN — INSULIN GLARGINE 6 UNITS: 100 INJECTION, SOLUTION SUBCUTANEOUS at 22:15

## 2021-12-05 RX ADMIN — DEXTROSE AND SODIUM CHLORIDE: 5; 450 INJECTION, SOLUTION INTRAVENOUS at 14:00

## 2021-12-05 RX ADMIN — FUROSEMIDE 20 MG: 20 TABLET ORAL at 17:28

## 2021-12-05 RX ADMIN — INSULIN LISPRO 1 UNITS: 100 INJECTION, SOLUTION INTRAVENOUS; SUBCUTANEOUS at 08:23

## 2021-12-05 RX ADMIN — PANTOPRAZOLE SODIUM 40 MG: 40 TABLET, DELAYED RELEASE ORAL at 17:29

## 2021-12-05 RX ADMIN — FLUOXETINE 20 MG: 20 CAPSULE ORAL at 08:21

## 2021-12-05 RX ADMIN — POTASSIUM CHLORIDE 40 MEQ: 1500 TABLET, EXTENDED RELEASE ORAL at 14:05

## 2021-12-05 RX ADMIN — PIPERACILLIN SODIUM AND TAZOBACTAM SODIUM 3.38 G: 3; .375 INJECTION, POWDER, LYOPHILIZED, FOR SOLUTION INTRAVENOUS at 22:24

## 2021-12-05 RX ADMIN — PIPERACILLIN SODIUM AND TAZOBACTAM SODIUM 3.38 G: 3; .375 INJECTION, POWDER, LYOPHILIZED, FOR SOLUTION INTRAVENOUS at 14:28

## 2021-12-05 RX ADMIN — PANTOPRAZOLE SODIUM 40 MG: 40 TABLET, DELAYED RELEASE ORAL at 08:21

## 2021-12-05 RX ADMIN — INSULIN LISPRO 2 UNITS: 100 INJECTION, SOLUTION INTRAVENOUS; SUBCUTANEOUS at 14:38

## 2021-12-05 RX ADMIN — PREDNISONE 5 MG: 5 TABLET ORAL at 17:29

## 2021-12-05 RX ADMIN — PIPERACILLIN SODIUM AND TAZOBACTAM SODIUM 3.38 G: 3; .375 INJECTION, POWDER, LYOPHILIZED, FOR SOLUTION INTRAVENOUS at 07:08

## 2021-12-05 RX ADMIN — AZATHIOPRINE 75 MG: 50 TABLET ORAL at 08:21

## 2021-12-05 RX ADMIN — INSULIN LISPRO 2 UNITS: 100 INJECTION, SOLUTION INTRAVENOUS; SUBCUTANEOUS at 22:15

## 2021-12-05 RX ADMIN — PREDNISONE 15 MG: 5 TABLET ORAL at 08:21

## 2021-12-05 ASSESSMENT — PAIN SCALES - GENERAL
PAINLEVEL_OUTOF10: 0
PAINLEVEL_OUTOF10: 0
PAINLEVEL_OUTOF10: 2

## 2021-12-05 ASSESSMENT — PAIN SCALES - WONG BAKER: WONGBAKER_NUMERICALRESPONSE: 0

## 2021-12-06 ENCOUNTER — APPOINTMENT (OUTPATIENT)
Dept: GENERAL RADIOLOGY | Age: 86
DRG: 871 | End: 2021-12-06
Attending: INTERNAL MEDICINE

## 2021-12-06 PROBLEM — K26.9 DUODENAL ULCER: Status: ACTIVE | Noted: 2021-12-01

## 2021-12-06 LAB
ANION GAP SERPL CALC-SCNC: 10 MMOL/L (ref 10–20)
BASOPHILS # BLD: 0 K/MCL (ref 0–0.3)
BASOPHILS NFR BLD: 0 %
BUN SERPL-MCNC: 41 MG/DL (ref 6–20)
BUN/CREAT SERPL: 46 (ref 7–25)
CALCIUM SERPL-MCNC: 7.6 MG/DL (ref 8.4–10.2)
CHLORIDE SERPL-SCNC: 101 MMOL/L (ref 98–107)
CO2 SERPL-SCNC: 27 MMOL/L (ref 21–32)
CREAT SERPL-MCNC: 0.9 MG/DL (ref 0.51–0.95)
DEPRECATED RDW RBC: 50.2 FL (ref 39–50)
EOSINOPHIL # BLD: 0 K/MCL (ref 0–0.5)
EOSINOPHIL NFR BLD: 0 %
ERYTHROCYTE [DISTWIDTH] IN BLOOD: 14.9 % (ref 11–15)
FASTING DURATION TIME PATIENT: ABNORMAL H
GFR SERPLBLD BASED ON 1.73 SQ M-ARVRAT: 58 ML/MIN
GLUCOSE BLDC GLUCOMTR-MCNC: 118 MG/DL (ref 70–99)
GLUCOSE BLDC GLUCOMTR-MCNC: 164 MG/DL (ref 70–99)
GLUCOSE BLDC GLUCOMTR-MCNC: 184 MG/DL (ref 70–99)
GLUCOSE BLDC GLUCOMTR-MCNC: 210 MG/DL (ref 70–99)
GLUCOSE SERPL-MCNC: 136 MG/DL (ref 70–99)
HCT VFR BLD CALC: 22.9 % (ref 36–46.5)
HGB BLD-MCNC: 7.6 G/DL (ref 12–15.5)
IMM GRANULOCYTES # BLD AUTO: 0.1 K/MCL (ref 0–0.2)
IMM GRANULOCYTES # BLD: 3 %
LYMPHOCYTES # BLD: 0.5 K/MCL (ref 1–4)
LYMPHOCYTES NFR BLD: 10 %
MCH RBC QN AUTO: 30.2 PG (ref 26–34)
MCHC RBC AUTO-ENTMCNC: 33.2 G/DL (ref 32–36.5)
MCV RBC AUTO: 90.9 FL (ref 78–100)
MONOCYTES # BLD: 0.3 K/MCL (ref 0.3–0.9)
MONOCYTES NFR BLD: 5 %
NEUTROPHILS # BLD: 4.4 K/MCL (ref 1.8–7.7)
NEUTROPHILS NFR BLD: 82 %
NRBC BLD MANUAL-RTO: 0 /100 WBC
PLATELET # BLD AUTO: 112 K/MCL (ref 140–450)
POTASSIUM SERPL-SCNC: 3.8 MMOL/L (ref 3.4–5.1)
RBC # BLD: 2.52 MIL/MCL (ref 4–5.2)
SODIUM SERPL-SCNC: 134 MMOL/L (ref 135–145)
WBC # BLD: 5.4 K/MCL (ref 4.2–11)

## 2021-12-06 PROCEDURE — 10002803 HB RX 637: Performed by: INTERNAL MEDICINE

## 2021-12-06 PROCEDURE — 71045 X-RAY EXAM CHEST 1 VIEW: CPT

## 2021-12-06 PROCEDURE — 36415 COLL VENOUS BLD VENIPUNCTURE: CPT | Performed by: INTERNAL MEDICINE

## 2021-12-06 PROCEDURE — 10003585 HB ROOM CHARGE INTERMEDIATE CARE

## 2021-12-06 PROCEDURE — C1751 CATH, INF, PER/CENT/MIDLINE: HCPCS

## 2021-12-06 PROCEDURE — 10002800 HB RX 250 W HCPCS: Performed by: INTERNAL MEDICINE

## 2021-12-06 PROCEDURE — 80048 BASIC METABOLIC PNL TOTAL CA: CPT | Performed by: INTERNAL MEDICINE

## 2021-12-06 PROCEDURE — 99233 SBSQ HOSP IP/OBS HIGH 50: CPT | Performed by: INTERNAL MEDICINE

## 2021-12-06 PROCEDURE — 85025 COMPLETE CBC W/AUTO DIFF WBC: CPT | Performed by: INTERNAL MEDICINE

## 2021-12-06 PROCEDURE — 10006023 HB SUPPLY 272

## 2021-12-06 PROCEDURE — 10004651 HB RX, NO CHARGE ITEM: Performed by: INTERNAL MEDICINE

## 2021-12-06 PROCEDURE — 10002807 HB RX 258: Performed by: INTERNAL MEDICINE

## 2021-12-06 PROCEDURE — 36573 INSJ PICC RS&I 5 YR+: CPT

## 2021-12-06 PROCEDURE — 10004281 HB COUNTER-STAFF TIME PER 15 MIN

## 2021-12-06 RX ORDER — 0.9 % SODIUM CHLORIDE 0.9 %
10 VIAL (ML) INJECTION PRN
Status: DISCONTINUED | OUTPATIENT
Start: 2021-12-06 | End: 2021-12-07 | Stop reason: HOSPADM

## 2021-12-06 RX ORDER — 0.9 % SODIUM CHLORIDE 0.9 %
10 VIAL (ML) INJECTION EVERY 12 HOURS SCHEDULED
Status: DISCONTINUED | OUTPATIENT
Start: 2021-12-06 | End: 2021-12-07 | Stop reason: HOSPADM

## 2021-12-06 RX ORDER — 0.9 % SODIUM CHLORIDE 0.9 %
20 VIAL (ML) INJECTION PRN
Status: DISCONTINUED | OUTPATIENT
Start: 2021-12-06 | End: 2021-12-07 | Stop reason: HOSPADM

## 2021-12-06 RX ORDER — CEFAZOLIN SODIUM/WATER 2 G/20 ML
2000 SYRINGE (ML) INTRAVENOUS DAILY
Status: DISCONTINUED | OUTPATIENT
Start: 2021-12-06 | End: 2021-12-07 | Stop reason: HOSPADM

## 2021-12-06 RX ORDER — POTASSIUM CHLORIDE 20 MEQ/1
40 TABLET, EXTENDED RELEASE ORAL ONCE
Status: COMPLETED | OUTPATIENT
Start: 2021-12-06 | End: 2021-12-06

## 2021-12-06 RX ADMIN — PREDNISONE 5 MG: 5 TABLET ORAL at 16:19

## 2021-12-06 RX ADMIN — SODIUM CHLORIDE, PRESERVATIVE FREE 2 ML: 5 INJECTION INTRAVENOUS at 22:32

## 2021-12-06 RX ADMIN — TRAMADOL HYDROCHLORIDE 50 MG: 50 TABLET ORAL at 16:35

## 2021-12-06 RX ADMIN — FUROSEMIDE 20 MG: 20 TABLET ORAL at 08:35

## 2021-12-06 RX ADMIN — Medication 2000 MG: at 16:18

## 2021-12-06 RX ADMIN — PIPERACILLIN SODIUM AND TAZOBACTAM SODIUM 3.38 G: 3; .375 INJECTION, POWDER, LYOPHILIZED, FOR SOLUTION INTRAVENOUS at 06:26

## 2021-12-06 RX ADMIN — TRAMADOL HYDROCHLORIDE 50 MG: 50 TABLET ORAL at 08:41

## 2021-12-06 RX ADMIN — FLUOXETINE 20 MG: 20 CAPSULE ORAL at 08:34

## 2021-12-06 RX ADMIN — INSULIN LISPRO 2 UNITS: 100 INJECTION, SOLUTION INTRAVENOUS; SUBCUTANEOUS at 22:27

## 2021-12-06 RX ADMIN — PANTOPRAZOLE SODIUM 40 MG: 40 TABLET, DELAYED RELEASE ORAL at 16:19

## 2021-12-06 RX ADMIN — PANTOPRAZOLE SODIUM 40 MG: 40 TABLET, DELAYED RELEASE ORAL at 08:35

## 2021-12-06 RX ADMIN — INSULIN GLARGINE 6 UNITS: 100 INJECTION, SOLUTION SUBCUTANEOUS at 22:26

## 2021-12-06 RX ADMIN — POTASSIUM CHLORIDE 40 MEQ: 1500 TABLET, EXTENDED RELEASE ORAL at 08:34

## 2021-12-06 RX ADMIN — SODIUM CHLORIDE 10 ML: 9 INJECTION, SOLUTION INTRAMUSCULAR; INTRAVENOUS; SUBCUTANEOUS at 16:30

## 2021-12-06 RX ADMIN — SODIUM CHLORIDE 10 ML: 9 INJECTION, SOLUTION INTRAMUSCULAR; INTRAVENOUS; SUBCUTANEOUS at 22:32

## 2021-12-06 RX ADMIN — AZATHIOPRINE 75 MG: 50 TABLET ORAL at 09:47

## 2021-12-06 RX ADMIN — PREDNISONE 15 MG: 5 TABLET ORAL at 08:34

## 2021-12-06 RX ADMIN — INSULIN LISPRO 1 UNITS: 100 INJECTION, SOLUTION INTRAVENOUS; SUBCUTANEOUS at 16:19

## 2021-12-06 ASSESSMENT — PAIN SCALES - GENERAL
PAINLEVEL_OUTOF10: 7
PAINLEVEL_OUTOF10: 2
PAINLEVEL_OUTOF10: 5
PAINLEVEL_OUTOF10: 5
PAINLEVEL_OUTOF10: 0

## 2021-12-06 ASSESSMENT — PAIN SCALES - WONG BAKER: WONGBAKER_NUMERICALRESPONSE: 5

## 2021-12-07 VITALS
WEIGHT: 139.11 LBS | RESPIRATION RATE: 18 BRPM | SYSTOLIC BLOOD PRESSURE: 125 MMHG | HEIGHT: 60 IN | BODY MASS INDEX: 27.31 KG/M2 | HEART RATE: 74 BPM | OXYGEN SATURATION: 99 % | TEMPERATURE: 97.5 F | DIASTOLIC BLOOD PRESSURE: 67 MMHG

## 2021-12-07 PROBLEM — G92.8 TOXIC METABOLIC ENCEPHALOPATHY: Status: ACTIVE | Noted: 2021-12-07

## 2021-12-07 LAB
ANION GAP SERPL CALC-SCNC: 8 MMOL/L (ref 10–20)
ASR DISCLAIMER: NORMAL
BACTERIA BLD CULT: NORMAL
BACTERIA BLD CULT: NORMAL
BASOPHILS # BLD: 0 K/MCL (ref 0–0.3)
BASOPHILS NFR BLD: 0 %
BUN SERPL-MCNC: 37 MG/DL (ref 6–20)
BUN/CREAT SERPL: 46 (ref 7–25)
CALCIUM SERPL-MCNC: 7.7 MG/DL (ref 8.4–10.2)
CASE RPRT: NORMAL
CHLORIDE SERPL-SCNC: 99 MMOL/L (ref 98–107)
CLINICAL INFO: NORMAL
CO2 SERPL-SCNC: 30 MMOL/L (ref 21–32)
CREAT SERPL-MCNC: 0.81 MG/DL (ref 0.51–0.95)
DEPRECATED RDW RBC: 50.5 FL (ref 39–50)
EOSINOPHIL # BLD: 0 K/MCL (ref 0–0.5)
EOSINOPHIL NFR BLD: 0 %
ERYTHROCYTE [DISTWIDTH] IN BLOOD: 15 % (ref 11–15)
FASTING DURATION TIME PATIENT: ABNORMAL H
GFR SERPLBLD BASED ON 1.73 SQ M-ARVRAT: 66 ML/MIN
GLUCOSE BLDC GLUCOMTR-MCNC: 105 MG/DL (ref 70–99)
GLUCOSE BLDC GLUCOMTR-MCNC: 173 MG/DL (ref 70–99)
GLUCOSE BLDC GLUCOMTR-MCNC: 71 MG/DL (ref 70–99)
GLUCOSE SERPL-MCNC: 119 MG/DL (ref 70–99)
HCT VFR BLD CALC: 21.6 % (ref 36–46.5)
HCT VFR BLD CALC: 22.6 % (ref 36–46.5)
HGB BLD-MCNC: 7.1 G/DL (ref 12–15.5)
HGB BLD-MCNC: 7.3 G/DL (ref 12–15.5)
LYMPHOCYTES # BLD: 0.3 K/MCL (ref 1–4)
LYMPHOCYTES NFR BLD: 6 %
MCH RBC QN AUTO: 30.1 PG (ref 26–34)
MCHC RBC AUTO-ENTMCNC: 32.9 G/DL (ref 32–36.5)
MCV RBC AUTO: 91.5 FL (ref 78–100)
MONOCYTES # BLD: 0.2 K/MCL (ref 0.3–0.9)
MONOCYTES NFR BLD: 4 %
MYELOCYTES # BLD MANUAL: 1 %
NEUTROPHILS # BLD: 5.2 K/MCL (ref 1.8–7.7)
NEUTS BAND NFR BLD: 4 % (ref 0–10)
NEUTS SEG NFR BLD: 85 %
NRBC BLD MANUAL-RTO: 0 /100 WBC
PATH REPORT.FINAL DX SPEC: NORMAL
PATH REPORT.GROSS SPEC: NORMAL
PLAT MORPH BLD: NORMAL
PLATELET # BLD AUTO: 103 K/MCL (ref 140–450)
POTASSIUM SERPL-SCNC: 3.6 MMOL/L (ref 3.4–5.1)
RBC # BLD: 2.36 MIL/MCL (ref 4–5.2)
SODIUM SERPL-SCNC: 133 MMOL/L (ref 135–145)
TARGETS BLD QL SMEAR: ABNORMAL
WBC # BLD: 5.8 K/MCL (ref 4.2–11)
WBC MORPH BLD: NORMAL

## 2021-12-07 PROCEDURE — 10002803 HB RX 637: Performed by: INTERNAL MEDICINE

## 2021-12-07 PROCEDURE — 99239 HOSP IP/OBS DSCHRG MGMT >30: CPT | Performed by: INTERNAL MEDICINE

## 2021-12-07 PROCEDURE — X1094 NO CHARGE VISIT: HCPCS | Performed by: INTERNAL MEDICINE

## 2021-12-07 PROCEDURE — 10002800 HB RX 250 W HCPCS: Performed by: INTERNAL MEDICINE

## 2021-12-07 PROCEDURE — 85027 COMPLETE CBC AUTOMATED: CPT | Performed by: INTERNAL MEDICINE

## 2021-12-07 PROCEDURE — 85018 HEMOGLOBIN: CPT | Performed by: INTERNAL MEDICINE

## 2021-12-07 PROCEDURE — 10004651 HB RX, NO CHARGE ITEM: Performed by: INTERNAL MEDICINE

## 2021-12-07 PROCEDURE — 80048 BASIC METABOLIC PNL TOTAL CA: CPT | Performed by: INTERNAL MEDICINE

## 2021-12-07 RX ORDER — PREDNISONE 5 MG/1
15 TABLET ORAL
Status: ON HOLD | INPATIENT
Start: 2021-12-08 | End: 2021-12-14 | Stop reason: SDUPTHER

## 2021-12-07 RX ORDER — PREDNISONE 5 MG/1
5 TABLET ORAL
Qty: 3 TABLET | Refills: 0 | INPATIENT
Start: 2021-12-07 | End: 2021-12-10

## 2021-12-07 RX ORDER — CEFAZOLIN SODIUM/WATER 2 G/20 ML
2000 SYRINGE (ML) INTRAVENOUS DAILY
Qty: 200 ML | Refills: 0 | Status: ON HOLD | INPATIENT
Start: 2021-12-08 | End: 2021-12-14 | Stop reason: SDUPTHER

## 2021-12-07 RX ORDER — POTASSIUM CHLORIDE 20 MEQ/1
40 TABLET, EXTENDED RELEASE ORAL ONCE
Status: COMPLETED | OUTPATIENT
Start: 2021-12-07 | End: 2021-12-07

## 2021-12-07 RX ORDER — PANTOPRAZOLE SODIUM 40 MG/1
40 TABLET, DELAYED RELEASE ORAL
Refills: 0 | INPATIENT
Start: 2021-12-07

## 2021-12-07 RX ORDER — CARVEDILOL 12.5 MG/1
12.5 TABLET ORAL 2 TIMES DAILY WITH MEALS
Status: DISCONTINUED | OUTPATIENT
Start: 2021-12-07 | End: 2021-12-07 | Stop reason: HOSPADM

## 2021-12-07 RX ORDER — CARVEDILOL 12.5 MG/1
12.5 TABLET ORAL 2 TIMES DAILY WITH MEALS
INPATIENT
Start: 2021-12-07

## 2021-12-07 RX ORDER — INSULIN GLARGINE 100 [IU]/ML
6 INJECTION, SOLUTION SUBCUTANEOUS NIGHTLY
Qty: 10 ML | Refills: 12 | Status: SHIPPED | OUTPATIENT
Start: 2021-12-07

## 2021-12-07 RX ADMIN — PANTOPRAZOLE SODIUM 40 MG: 40 TABLET, DELAYED RELEASE ORAL at 05:21

## 2021-12-07 RX ADMIN — SODIUM CHLORIDE 10 ML: 9 INJECTION, SOLUTION INTRAMUSCULAR; INTRAVENOUS; SUBCUTANEOUS at 09:30

## 2021-12-07 RX ADMIN — TRAMADOL HYDROCHLORIDE 50 MG: 50 TABLET ORAL at 01:47

## 2021-12-07 RX ADMIN — FLUOXETINE 20 MG: 20 CAPSULE ORAL at 10:22

## 2021-12-07 RX ADMIN — AZATHIOPRINE 75 MG: 50 TABLET ORAL at 10:37

## 2021-12-07 RX ADMIN — ISOSORBIDE MONONITRATE 60 MG: 30 TABLET, EXTENDED RELEASE ORAL at 10:22

## 2021-12-07 RX ADMIN — PREDNISONE 15 MG: 5 TABLET ORAL at 10:21

## 2021-12-07 RX ADMIN — POTASSIUM CHLORIDE 40 MEQ: 1500 TABLET, EXTENDED RELEASE ORAL at 10:22

## 2021-12-07 RX ADMIN — Medication 2000 MG: at 10:21

## 2021-12-07 RX ADMIN — FUROSEMIDE 20 MG: 20 TABLET ORAL at 10:22

## 2021-12-07 RX ADMIN — CARVEDILOL 12.5 MG: 12.5 TABLET, FILM COATED ORAL at 10:21

## 2021-12-07 RX ADMIN — PANTOPRAZOLE SODIUM 40 MG: 40 TABLET, DELAYED RELEASE ORAL at 15:54

## 2021-12-07 ASSESSMENT — PAIN SCALES - PAIN ASSESSMENT IN ADVANCED DEMENTIA (PAINAD)
BREATHING: NORMAL
CONSOLABILITY: DISTRACTED OR REASSURED BY VOICE OR TOUCH
BODYLANGUAGE: TENSE, DISTRESSED, FIDGETING
FACIALEXPRESSION: SMILING OR INEXPRESSIVE
NEGVOCALIZATION: REPEATED TROUBLED CALLING OUT. LOUD MOANING OR GROANING. CRYING
TOTALSCORE: 5
BODYLANGUAGE: RELAXED
CONSOLABILITY: NO NEED TO CONSOLE
FACIALEXPRESSION: SAD. FRIGHTENED. FROWNING
BREATHING: NORMAL
TOTALSCORE: 0

## 2021-12-07 ASSESSMENT — PAIN SCALES - GENERAL: PAINLEVEL_OUTOF10: 0

## 2021-12-08 ENCOUNTER — ADVANCED DIRECTIVES (OUTPATIENT)
Dept: HEALTH INFORMATION MANAGEMENT | Age: 86
End: 2021-12-08

## 2021-12-08 LAB
BACTERIA BLD CULT: ABNORMAL
BACTERIA BLD CULT: ABNORMAL
GRAM STN SPEC: ABNORMAL
GRAM STN SPEC: ABNORMAL

## 2021-12-09 ENCOUNTER — HOSPITAL ENCOUNTER (EMERGENCY)
Age: 86
Discharge: NURSING, REHAB OR LONG-TERM CARE | End: 2021-12-09
Attending: EMERGENCY MEDICINE

## 2021-12-09 VITALS
BODY MASS INDEX: 27.17 KG/M2 | DIASTOLIC BLOOD PRESSURE: 73 MMHG | HEIGHT: 60 IN | OXYGEN SATURATION: 98 % | SYSTOLIC BLOOD PRESSURE: 159 MMHG | HEART RATE: 95 BPM | RESPIRATION RATE: 18 BRPM | TEMPERATURE: 98.2 F

## 2021-12-09 DIAGNOSIS — D64.9 ANEMIA, UNSPECIFIED TYPE: Primary | ICD-10-CM

## 2021-12-09 DIAGNOSIS — D69.6 THROMBOCYTOPENIA (CMD): ICD-10-CM

## 2021-12-09 DIAGNOSIS — K74.60 HEPATIC CIRRHOSIS, UNSPECIFIED HEPATIC CIRRHOSIS TYPE, UNSPECIFIED WHETHER ASCITES PRESENT (CMD): ICD-10-CM

## 2021-12-09 LAB
ABO + RH BLD: NORMAL
ALBUMIN SERPL-MCNC: 1.1 G/DL (ref 3.6–5.1)
ALBUMIN/GLOB SERPL: 0.3 {RATIO} (ref 1–2.4)
ALP SERPL-CCNC: 418 UNITS/L (ref 45–117)
ALT SERPL-CCNC: 52 UNITS/L
ANION GAP SERPL CALC-SCNC: 9 MMOL/L (ref 10–20)
AST SERPL-CCNC: 51 UNITS/L
BASOPHILS # BLD: 0 K/MCL (ref 0–0.3)
BASOPHILS NFR BLD: 0 %
BILIRUB SERPL-MCNC: 2 MG/DL (ref 0.2–1)
BLD GP AB SCN SERPL QL GEL: NEGATIVE
BLOOD EXPIRATION DATE: NORMAL
BLOOD EXPIRATION DATE: NORMAL
BUN SERPL-MCNC: 34 MG/DL (ref 6–20)
BUN/CREAT SERPL: 41 (ref 7–25)
CALCIUM SERPL-MCNC: 8.2 MG/DL (ref 8.4–10.2)
CHLORIDE SERPL-SCNC: 98 MMOL/L (ref 98–107)
CO2 SERPL-SCNC: 28 MMOL/L (ref 21–32)
CREAT SERPL-MCNC: 0.83 MG/DL (ref 0.51–0.95)
CROSSMATCH RESULT: NORMAL
CROSSMATCH RESULT: NORMAL
DEPRECATED RDW RBC: 50.5 FL (ref 39–50)
DISPENSE STATUS: NORMAL
DISPENSE STATUS: NORMAL
EOSINOPHIL # BLD: 0 K/MCL (ref 0–0.5)
EOSINOPHIL NFR BLD: 0 %
ERYTHROCYTE [DISTWIDTH] IN BLOOD: 14.9 % (ref 11–15)
FASTING DURATION TIME PATIENT: ABNORMAL H
GFR SERPLBLD BASED ON 1.73 SQ M-ARVRAT: 64 ML/MIN
GLOBULIN SER-MCNC: 3.3 G/DL (ref 2–4)
GLUCOSE SERPL-MCNC: 216 MG/DL (ref 70–99)
HCT VFR BLD CALC: 21.2 % (ref 36–46.5)
HGB BLD-MCNC: 6.8 G/DL (ref 12–15.5)
ISBT BLOOD TYPE: 6200
ISBT BLOOD TYPE: 6200
ISSUE DATE/TIME: NORMAL
ISSUE DATE/TIME: NORMAL
LYMPHOCYTES # BLD: 0.3 K/MCL (ref 1–4)
LYMPHOCYTES NFR BLD: 6 %
MCH RBC QN AUTO: 30.1 PG (ref 26–34)
MCHC RBC AUTO-ENTMCNC: 32.1 G/DL (ref 32–36.5)
MCV RBC AUTO: 93.8 FL (ref 78–100)
METAMYELOCYTES NFR BLD: 1 % (ref 0–2)
MONOCYTES # BLD: 0.1 K/MCL (ref 0.3–0.9)
MONOCYTES NFR BLD: 2 %
MYELOCYTES # BLD MANUAL: 1 %
NEUTROPHILS # BLD: 5 K/MCL (ref 1.8–7.7)
NEUTS BAND NFR BLD: 4 % (ref 0–10)
NEUTS SEG NFR BLD: 86 %
NRBC BLD MANUAL-RTO: 0 /100 WBC
PLAT MORPH BLD: NORMAL
PLATELET # BLD AUTO: 90 K/MCL (ref 140–450)
POTASSIUM SERPL-SCNC: 3.5 MMOL/L (ref 3.4–5.1)
PRODUCT CODE: NORMAL
PRODUCT CODE: NORMAL
PRODUCT DESCRIPTION: NORMAL
PRODUCT DESCRIPTION: NORMAL
PRODUCT ID: NORMAL
PRODUCT ID: NORMAL
PROT SERPL-MCNC: 4.4 G/DL (ref 6.4–8.2)
RAINBOW EXTRA TUBES HOLD SPECIMEN: NORMAL
RBC # BLD: 2.26 MIL/MCL (ref 4–5.2)
RBC MORPH BLD: NORMAL
SODIUM SERPL-SCNC: 131 MMOL/L (ref 135–145)
TOXIC GRANULES BLD QL SMEAR: PRESENT
TYPE AND SCREEN EXPIRATION DATE: NORMAL
UNIT BLOOD TYPE: NORMAL
UNIT BLOOD TYPE: NORMAL
UNIT NUMBER: NORMAL
UNIT NUMBER: NORMAL
WBC # BLD: 5.6 K/MCL (ref 4.2–11)

## 2021-12-09 PROCEDURE — 85027 COMPLETE CBC AUTOMATED: CPT | Performed by: EMERGENCY MEDICINE

## 2021-12-09 PROCEDURE — 99283 EMERGENCY DEPT VISIT LOW MDM: CPT

## 2021-12-09 PROCEDURE — 80053 COMPREHEN METABOLIC PANEL: CPT | Performed by: EMERGENCY MEDICINE

## 2021-12-09 PROCEDURE — 86923 COMPATIBILITY TEST ELECTRIC: CPT

## 2021-12-09 PROCEDURE — 36430 TRANSFUSION BLD/BLD COMPNT: CPT

## 2021-12-09 PROCEDURE — 86850 RBC ANTIBODY SCREEN: CPT | Performed by: EMERGENCY MEDICINE

## 2021-12-09 PROCEDURE — 36415 COLL VENOUS BLD VENIPUNCTURE: CPT

## 2021-12-09 RX ORDER — SODIUM CHLORIDE 9 MG/ML
INJECTION, SOLUTION INTRAVENOUS CONTINUOUS PRN
Status: DISCONTINUED | OUTPATIENT
Start: 2021-12-09 | End: 2021-12-09 | Stop reason: HOSPADM

## 2021-12-09 ASSESSMENT — PAIN SCALES - GENERAL: PAINLEVEL_OUTOF10: 0

## 2021-12-13 ENCOUNTER — APPOINTMENT (OUTPATIENT)
Dept: GENERAL RADIOLOGY | Age: 86
End: 2021-12-13
Attending: EMERGENCY MEDICINE

## 2021-12-13 ENCOUNTER — HOSPITAL ENCOUNTER (OUTPATIENT)
Age: 86
Setting detail: OBSERVATION
Discharge: NURSING, REHAB OR LONG-TERM CARE | End: 2021-12-14
Attending: EMERGENCY MEDICINE | Admitting: INTERNAL MEDICINE

## 2021-12-13 DIAGNOSIS — N20.0 RIGHT NEPHROLITHIASIS: ICD-10-CM

## 2021-12-13 DIAGNOSIS — E87.6 HYPOKALEMIA: ICD-10-CM

## 2021-12-13 DIAGNOSIS — L89.150 PRESSURE INJURY OF SACRAL REGION, UNSTAGEABLE (CMD): ICD-10-CM

## 2021-12-13 DIAGNOSIS — G92.8 TOXIC METABOLIC ENCEPHALOPATHY: ICD-10-CM

## 2021-12-13 DIAGNOSIS — N39.0 SEPSIS DUE TO GRAM-NEGATIVE UTI (CMD): ICD-10-CM

## 2021-12-13 DIAGNOSIS — Z71.89 ACP (ADVANCE CARE PLANNING): ICD-10-CM

## 2021-12-13 DIAGNOSIS — A41.50 SEPSIS DUE TO GRAM-NEGATIVE UTI (CMD): ICD-10-CM

## 2021-12-13 DIAGNOSIS — E83.42 HYPOMAGNESEMIA: ICD-10-CM

## 2021-12-13 DIAGNOSIS — Z13.9 ENCOUNTER FOR MEDICAL SCREENING EXAMINATION: Primary | ICD-10-CM

## 2021-12-13 DIAGNOSIS — D64.9 ANEMIA, UNSPECIFIED TYPE: ICD-10-CM

## 2021-12-13 DIAGNOSIS — K75.4 AUTOIMMUNE HEPATITIS (CMD): ICD-10-CM

## 2021-12-13 DIAGNOSIS — K26.9 DUODENAL ULCER: ICD-10-CM

## 2021-12-13 DIAGNOSIS — K92.1 BLOOD IN STOOL: ICD-10-CM

## 2021-12-13 PROBLEM — M79.89 SWELLING OF EXTREMITY: Status: ACTIVE | Noted: 2021-12-13

## 2021-12-13 LAB
ABO + RH BLD: NORMAL
ALBUMIN SERPL-MCNC: 1.1 G/DL (ref 3.6–5.1)
ALBUMIN/GLOB SERPL: 0.3 {RATIO} (ref 1–2.4)
ALP SERPL-CCNC: 362 UNITS/L (ref 45–117)
ALT SERPL-CCNC: 40 UNITS/L
ANION GAP SERPL CALC-SCNC: 6 MMOL/L (ref 10–20)
APPEARANCE UR: ABNORMAL
APTT PPP: 31 SEC (ref 22–30)
AST SERPL-CCNC: 29 UNITS/L
BACTERIA #/AREA URNS HPF: ABNORMAL /HPF
BASOPHILS # BLD: 0 K/MCL (ref 0–0.3)
BASOPHILS NFR BLD: 0 %
BILIRUB SERPL-MCNC: 1.7 MG/DL (ref 0.2–1)
BILIRUB UR QL STRIP: NEGATIVE
BLD GP AB SCN SERPL QL GEL: NEGATIVE
BUN SERPL-MCNC: 27 MG/DL (ref 6–20)
BUN/CREAT SERPL: 38 (ref 7–25)
CALCIUM SERPL-MCNC: 7.7 MG/DL (ref 8.4–10.2)
CHLORIDE SERPL-SCNC: 97 MMOL/L (ref 98–107)
CO2 SERPL-SCNC: 32 MMOL/L (ref 21–32)
COLOR UR: ABNORMAL
CREAT SERPL-MCNC: 0.71 MG/DL (ref 0.51–0.95)
DEPRECATED RDW RBC: 48.4 FL (ref 39–50)
EOSINOPHIL # BLD: 0 K/MCL (ref 0–0.5)
EOSINOPHIL NFR BLD: 0 %
ERYTHROCYTE [DISTWIDTH] IN BLOOD: 14.9 % (ref 11–15)
FASTING DURATION TIME PATIENT: ABNORMAL H
GFR SERPLBLD BASED ON 1.73 SQ M-ARVRAT: 76 ML/MIN
GLOBULIN SER-MCNC: 3.2 G/DL (ref 2–4)
GLUCOSE SERPL-MCNC: 218 MG/DL (ref 70–99)
GLUCOSE UR STRIP-MCNC: NEGATIVE MG/DL
HCT VFR BLD CALC: 28.8 % (ref 36–46.5)
HEMOCCULT STL QL: POSITIVE
HGB BLD-MCNC: 9.5 G/DL (ref 12–15.5)
HGB UR QL STRIP: ABNORMAL
HYALINE CASTS #/AREA URNS LPF: ABNORMAL /LPF
IMM GRANULOCYTES # BLD AUTO: 0.2 K/MCL (ref 0–0.2)
IMM GRANULOCYTES # BLD: 3 %
INR PPP: 1.2
KETONES UR STRIP-MCNC: NEGATIVE MG/DL
LEUKOCYTE ESTERASE UR QL STRIP: ABNORMAL
LYMPHOCYTES # BLD: 0.5 K/MCL (ref 1–4)
LYMPHOCYTES NFR BLD: 8 %
MAGNESIUM SERPL-MCNC: 1.6 MG/DL (ref 1.7–2.4)
MCH RBC QN AUTO: 29.8 PG (ref 26–34)
MCHC RBC AUTO-ENTMCNC: 33 G/DL (ref 32–36.5)
MCV RBC AUTO: 90.3 FL (ref 78–100)
MONOCYTES # BLD: 0.2 K/MCL (ref 0.3–0.9)
MONOCYTES NFR BLD: 3 %
MUCOUS THREADS URNS QL MICRO: PRESENT
NEUTROPHILS # BLD: 6.1 K/MCL (ref 1.8–7.7)
NEUTROPHILS NFR BLD: 86 %
NITRITE UR QL STRIP: NEGATIVE
NRBC BLD MANUAL-RTO: 0 /100 WBC
PH UR STRIP: 6 [PH] (ref 5–7)
PLATELET # BLD AUTO: 82 K/MCL (ref 140–450)
POTASSIUM SERPL-SCNC: 3.1 MMOL/L (ref 3.4–5.1)
PROT SERPL-MCNC: 4.3 G/DL (ref 6.4–8.2)
PROT UR STRIP-MCNC: 30 MG/DL
PROTHROMBIN TIME: 12.5 SEC (ref 9.7–11.8)
RAINBOW EXTRA TUBES HOLD SPECIMEN: NORMAL
RBC # BLD: 3.19 MIL/MCL (ref 4–5.2)
RBC #/AREA URNS HPF: ABNORMAL /HPF
SARS-COV-2 RNA RESP QL NAA+PROBE: NOT DETECTED
SERVICE CMNT-IMP: NORMAL
SERVICE CMNT-IMP: NORMAL
SODIUM SERPL-SCNC: 132 MMOL/L (ref 135–145)
SP GR UR STRIP: 1.02 (ref 1–1.03)
SQUAMOUS #/AREA URNS HPF: ABNORMAL /HPF
TRANS CELLS #/AREA URNS HPF: ABNORMAL /HPF
TYPE AND SCREEN EXPIRATION DATE: NORMAL
UROBILINOGEN UR STRIP-MCNC: 0.2 MG/DL
WBC # BLD: 7.1 K/MCL (ref 4.2–11)
WBC #/AREA URNS HPF: >100 /HPF

## 2021-12-13 PROCEDURE — 86900 BLOOD TYPING SEROLOGIC ABO: CPT | Performed by: EMERGENCY MEDICINE

## 2021-12-13 PROCEDURE — 85730 THROMBOPLASTIN TIME PARTIAL: CPT | Performed by: EMERGENCY MEDICINE

## 2021-12-13 PROCEDURE — 99220 INITIAL OBSERVATION CARE,LEVL III: CPT | Performed by: INTERNAL MEDICINE

## 2021-12-13 PROCEDURE — 83735 ASSAY OF MAGNESIUM: CPT | Performed by: EMERGENCY MEDICINE

## 2021-12-13 PROCEDURE — 71045 X-RAY EXAM CHEST 1 VIEW: CPT

## 2021-12-13 PROCEDURE — G0378 HOSPITAL OBSERVATION PER HR: HCPCS

## 2021-12-13 PROCEDURE — 82270 OCCULT BLOOD FECES: CPT | Performed by: INTERNAL MEDICINE

## 2021-12-13 PROCEDURE — 80053 COMPREHEN METABOLIC PANEL: CPT | Performed by: EMERGENCY MEDICINE

## 2021-12-13 PROCEDURE — 85610 PROTHROMBIN TIME: CPT | Performed by: EMERGENCY MEDICINE

## 2021-12-13 PROCEDURE — 81001 URINALYSIS AUTO W/SCOPE: CPT | Performed by: EMERGENCY MEDICINE

## 2021-12-13 PROCEDURE — 10002803 HB RX 637: Performed by: EMERGENCY MEDICINE

## 2021-12-13 PROCEDURE — 82272 OCCULT BLD FECES 1-3 TESTS: CPT

## 2021-12-13 PROCEDURE — 85025 COMPLETE CBC W/AUTO DIFF WBC: CPT | Performed by: EMERGENCY MEDICINE

## 2021-12-13 PROCEDURE — 99284 EMERGENCY DEPT VISIT MOD MDM: CPT

## 2021-12-13 PROCEDURE — 87086 URINE CULTURE/COLONY COUNT: CPT | Performed by: EMERGENCY MEDICINE

## 2021-12-13 PROCEDURE — 10002803 HB RX 637: Performed by: INTERNAL MEDICINE

## 2021-12-13 PROCEDURE — 87635 SARS-COV-2 COVID-19 AMP PRB: CPT | Performed by: EMERGENCY MEDICINE

## 2021-12-13 PROCEDURE — 36415 COLL VENOUS BLD VENIPUNCTURE: CPT

## 2021-12-13 RX ORDER — ISOSORBIDE MONONITRATE 30 MG/1
60 TABLET, EXTENDED RELEASE ORAL DAILY
Status: DISCONTINUED | OUTPATIENT
Start: 2021-12-14 | End: 2021-12-14 | Stop reason: HOSPADM

## 2021-12-13 RX ORDER — FLUOXETINE HYDROCHLORIDE 20 MG/1
20 CAPSULE ORAL DAILY
Status: DISCONTINUED | OUTPATIENT
Start: 2021-12-14 | End: 2021-12-14 | Stop reason: HOSPADM

## 2021-12-13 RX ORDER — DEXTROSE MONOHYDRATE 25 G/50ML
25 INJECTION, SOLUTION INTRAVENOUS PRN
Status: DISCONTINUED | OUTPATIENT
Start: 2021-12-13 | End: 2021-12-14 | Stop reason: HOSPADM

## 2021-12-13 RX ORDER — POLYETHYLENE GLYCOL 3350 17 G/17G
17 POWDER, FOR SOLUTION ORAL DAILY PRN
Status: DISCONTINUED | OUTPATIENT
Start: 2021-12-13 | End: 2021-12-14 | Stop reason: HOSPADM

## 2021-12-13 RX ORDER — NICOTINE POLACRILEX 4 MG
30 LOZENGE BUCCAL PRN
Status: DISCONTINUED | OUTPATIENT
Start: 2021-12-13 | End: 2021-12-14 | Stop reason: HOSPADM

## 2021-12-13 RX ORDER — CARVEDILOL 12.5 MG/1
12.5 TABLET ORAL 2 TIMES DAILY WITH MEALS
Status: DISCONTINUED | OUTPATIENT
Start: 2021-12-14 | End: 2021-12-14 | Stop reason: HOSPADM

## 2021-12-13 RX ORDER — NICOTINE POLACRILEX 4 MG
15 LOZENGE BUCCAL PRN
Status: DISCONTINUED | OUTPATIENT
Start: 2021-12-13 | End: 2021-12-14 | Stop reason: HOSPADM

## 2021-12-13 RX ORDER — CHOLESTYRAMINE LIGHT 4 G/5.7G
4 POWDER, FOR SUSPENSION ORAL DAILY
Status: DISCONTINUED | OUTPATIENT
Start: 2021-12-14 | End: 2021-12-14 | Stop reason: HOSPADM

## 2021-12-13 RX ORDER — PANTOPRAZOLE SODIUM 40 MG/1
40 TABLET, DELAYED RELEASE ORAL
Status: DISCONTINUED | OUTPATIENT
Start: 2021-12-14 | End: 2021-12-14

## 2021-12-13 RX ORDER — ONDANSETRON 2 MG/ML
4 INJECTION INTRAMUSCULAR; INTRAVENOUS 2 TIMES DAILY PRN
Status: DISCONTINUED | OUTPATIENT
Start: 2021-12-13 | End: 2021-12-14 | Stop reason: HOSPADM

## 2021-12-13 RX ORDER — AZATHIOPRINE 50 MG/1
75 TABLET ORAL DAILY
Status: DISCONTINUED | OUTPATIENT
Start: 2021-12-14 | End: 2021-12-14 | Stop reason: HOSPADM

## 2021-12-13 RX ORDER — TRAMADOL HYDROCHLORIDE 50 MG/1
50 TABLET ORAL EVERY 8 HOURS PRN
Status: DISCONTINUED | OUTPATIENT
Start: 2021-12-13 | End: 2021-12-14 | Stop reason: HOSPADM

## 2021-12-13 RX ORDER — AMOXICILLIN 250 MG
2 CAPSULE ORAL DAILY PRN
Status: DISCONTINUED | OUTPATIENT
Start: 2021-12-13 | End: 2021-12-14 | Stop reason: HOSPADM

## 2021-12-13 RX ORDER — DEXTROSE, SODIUM CHLORIDE, SODIUM LACTATE, POTASSIUM CHLORIDE, AND CALCIUM CHLORIDE 5; .6; .31; .03; .02 G/100ML; G/100ML; G/100ML; G/100ML; G/100ML
INJECTION, SOLUTION INTRAVENOUS CONTINUOUS
Status: DISCONTINUED | OUTPATIENT
Start: 2021-12-13 | End: 2021-12-14 | Stop reason: HOSPADM

## 2021-12-13 RX ORDER — DEXTROSE MONOHYDRATE 25 G/50ML
12.5 INJECTION, SOLUTION INTRAVENOUS PRN
Status: DISCONTINUED | OUTPATIENT
Start: 2021-12-13 | End: 2021-12-14 | Stop reason: HOSPADM

## 2021-12-13 RX ORDER — POTASSIUM CHLORIDE 20 MEQ/1
40 TABLET, EXTENDED RELEASE ORAL ONCE
Status: COMPLETED | OUTPATIENT
Start: 2021-12-13 | End: 2021-12-13

## 2021-12-13 RX ORDER — PREDNISONE 5 MG/1
15 TABLET ORAL
Status: DISCONTINUED | OUTPATIENT
Start: 2021-12-14 | End: 2021-12-14 | Stop reason: HOSPADM

## 2021-12-13 RX ORDER — CEFAZOLIN SODIUM/WATER 1 G/10 ML
1 SYRINGE (ML) INTRAVENOUS DAILY
Status: DISCONTINUED | OUTPATIENT
Start: 2021-12-14 | End: 2021-12-14 | Stop reason: HOSPADM

## 2021-12-13 RX ORDER — ALPRAZOLAM 0.25 MG/1
0.25 TABLET ORAL EVERY 12 HOURS PRN
Status: DISCONTINUED | OUTPATIENT
Start: 2021-12-13 | End: 2021-12-14 | Stop reason: HOSPADM

## 2021-12-13 RX ORDER — 0.9 % SODIUM CHLORIDE 0.9 %
2 VIAL (ML) INJECTION EVERY 12 HOURS SCHEDULED
Status: DISCONTINUED | OUTPATIENT
Start: 2021-12-13 | End: 2021-12-14 | Stop reason: HOSPADM

## 2021-12-13 RX ORDER — LANOLIN ALCOHOL/MO/W.PET/CERES
400 CREAM (GRAM) TOPICAL ONCE
Status: COMPLETED | OUTPATIENT
Start: 2021-12-13 | End: 2021-12-13

## 2021-12-13 RX ADMIN — Medication 400 MG: at 15:28

## 2021-12-13 RX ADMIN — TRAMADOL HYDROCHLORIDE 50 MG: 50 TABLET ORAL at 22:45

## 2021-12-13 RX ADMIN — POTASSIUM CHLORIDE 40 MEQ: 1500 TABLET, EXTENDED RELEASE ORAL at 15:28

## 2021-12-13 ASSESSMENT — LIFESTYLE VARIABLES
ALCOHOL_USE_STATUS: NO OR LOW RISK WITH VALIDATED TOOL
HOW OFTEN DO YOU HAVE 6 OR MORE DRINKS ON ONE OCCASION: NEVER
AUDIT-C TOTAL SCORE: 0
HOW OFTEN DO YOU HAVE A DRINK CONTAINING ALCOHOL: NEVER
HOW MANY STANDARD DRINKS CONTAINING ALCOHOL DO YOU HAVE ON A TYPICAL DAY: 0,1 OR 2

## 2021-12-13 ASSESSMENT — ACTIVITIES OF DAILY LIVING (ADL)
ADL_SHORT_OF_BREATH: NO
ADL_BEFORE_ADMISSION: NEEDS/REQUIRES ASSISTANCE
DRESSING YOURSELF: DEPENDENT
RECENT_DECLINE_ADL: YES, DECLINE IN AMBULATION/TRANSFERRING, COLLABORATE WITH PROVIDER (T)
CHRONIC_PAIN_PRESENT: NO
FEEDING YOURSELF: DEPENDENT
TOILETING: DEPENDENT
BATHING: DEPENDENT
TRANSFERRING: DEPENDENT
CONTINENCE: DEPENDENT
ADL_SCORE: 0

## 2021-12-13 ASSESSMENT — ENCOUNTER SYMPTOMS
DIZZINESS: 0
SINUS PAIN: 0
CHEST TIGHTNESS: 0
BACK PAIN: 0
FEVER: 0
CONFUSION: 0
DIARRHEA: 0
SHORTNESS OF BREATH: 0
VOMITING: 0
NUMBNESS: 0
ABDOMINAL PAIN: 0
EYE PAIN: 0
WEAKNESS: 0
COUGH: 0
WOUND: 0

## 2021-12-13 ASSESSMENT — COGNITIVE AND FUNCTIONAL STATUS - GENERAL
ARE YOU DEAF OR DO YOU HAVE SERIOUS DIFFICULTY  HEARING: NO
ARE YOU BLIND OR DO YOU HAVE SERIOUS DIFFICULTY SEEING, EVEN WHEN WEARING GLASSES: NO

## 2021-12-13 ASSESSMENT — PAIN SCALES - GENERAL: PAINLEVEL_OUTOF10: 0

## 2021-12-14 ENCOUNTER — APPOINTMENT (OUTPATIENT)
Dept: ULTRASOUND IMAGING | Age: 86
End: 2021-12-14
Attending: INTERNAL MEDICINE

## 2021-12-14 VITALS
TEMPERATURE: 98 F | HEART RATE: 102 BPM | OXYGEN SATURATION: 97 % | DIASTOLIC BLOOD PRESSURE: 68 MMHG | RESPIRATION RATE: 18 BRPM | WEIGHT: 135.36 LBS | SYSTOLIC BLOOD PRESSURE: 124 MMHG | BODY MASS INDEX: 25.56 KG/M2 | HEIGHT: 61 IN

## 2021-12-14 LAB
ALBUMIN SERPL-MCNC: 1 G/DL (ref 3.6–5.1)
ALBUMIN/GLOB SERPL: 0.3 {RATIO} (ref 1–2.4)
ALP SERPL-CCNC: 366 UNITS/L (ref 45–117)
ALT SERPL-CCNC: 42 UNITS/L
ANION GAP SERPL CALC-SCNC: 5 MMOL/L (ref 10–20)
AST SERPL-CCNC: 35 UNITS/L
BILIRUB SERPL-MCNC: 1.8 MG/DL (ref 0.2–1)
BUN SERPL-MCNC: 25 MG/DL (ref 6–20)
BUN/CREAT SERPL: 48 (ref 7–25)
CALCIUM SERPL-MCNC: 7.9 MG/DL (ref 8.4–10.2)
CHLORIDE SERPL-SCNC: 99 MMOL/L (ref 98–107)
CO2 SERPL-SCNC: 32 MMOL/L (ref 21–32)
CREAT SERPL-MCNC: 0.52 MG/DL (ref 0.51–0.95)
DEPRECATED RDW RBC: 48.6 FL (ref 39–50)
ERYTHROCYTE [DISTWIDTH] IN BLOOD: 15.1 % (ref 11–15)
FASTING DURATION TIME PATIENT: ABNORMAL H
GFR SERPLBLD BASED ON 1.73 SQ M-ARVRAT: 86 ML/MIN
GLOBULIN SER-MCNC: 3.2 G/DL (ref 2–4)
GLUCOSE BLDC GLUCOMTR-MCNC: 177 MG/DL (ref 70–99)
GLUCOSE BLDC GLUCOMTR-MCNC: 179 MG/DL (ref 70–99)
GLUCOSE BLDC GLUCOMTR-MCNC: 217 MG/DL (ref 70–99)
GLUCOSE BLDC GLUCOMTR-MCNC: 245 MG/DL (ref 70–99)
GLUCOSE SERPL-MCNC: 225 MG/DL (ref 70–99)
HCT VFR BLD CALC: 27 % (ref 36–46.5)
HGB BLD-MCNC: 8.8 G/DL (ref 12–15.5)
MAGNESIUM SERPL-MCNC: 1.7 MG/DL (ref 1.7–2.4)
MCH RBC QN AUTO: 29.5 PG (ref 26–34)
MCHC RBC AUTO-ENTMCNC: 32.6 G/DL (ref 32–36.5)
MCV RBC AUTO: 90.6 FL (ref 78–100)
NRBC BLD MANUAL-RTO: 0 /100 WBC
PLATELET # BLD AUTO: 80 K/MCL (ref 140–450)
POTASSIUM SERPL-SCNC: 3.4 MMOL/L (ref 3.4–5.1)
PROT SERPL-MCNC: 4.2 G/DL (ref 6.4–8.2)
RBC # BLD: 2.98 MIL/MCL (ref 4–5.2)
SODIUM SERPL-SCNC: 133 MMOL/L (ref 135–145)
WBC # BLD: 6 K/MCL (ref 4.2–11)

## 2021-12-14 PROCEDURE — 10004651 HB RX, NO CHARGE ITEM: Performed by: INTERNAL MEDICINE

## 2021-12-14 PROCEDURE — 10002807 HB RX 258: Performed by: INTERNAL MEDICINE

## 2021-12-14 PROCEDURE — 80053 COMPREHEN METABOLIC PANEL: CPT | Performed by: INTERNAL MEDICINE

## 2021-12-14 PROCEDURE — G0378 HOSPITAL OBSERVATION PER HR: HCPCS

## 2021-12-14 PROCEDURE — 99217 OBSERVATION CARE DISCHARGE: CPT | Performed by: INTERNAL MEDICINE

## 2021-12-14 PROCEDURE — 10002803 HB RX 637: Performed by: INTERNAL MEDICINE

## 2021-12-14 PROCEDURE — 99205 OFFICE O/P NEW HI 60 MIN: CPT | Performed by: NURSE PRACTITIONER

## 2021-12-14 PROCEDURE — 82962 GLUCOSE BLOOD TEST: CPT

## 2021-12-14 PROCEDURE — 10002800 HB RX 250 W HCPCS: Performed by: INTERNAL MEDICINE

## 2021-12-14 PROCEDURE — 85027 COMPLETE CBC AUTOMATED: CPT | Performed by: INTERNAL MEDICINE

## 2021-12-14 PROCEDURE — 96374 THER/PROPH/DIAG INJ IV PUSH: CPT

## 2021-12-14 PROCEDURE — X1094 NO CHARGE VISIT: HCPCS | Performed by: INTERNAL MEDICINE

## 2021-12-14 PROCEDURE — 93971 EXTREMITY STUDY: CPT

## 2021-12-14 PROCEDURE — 83735 ASSAY OF MAGNESIUM: CPT | Performed by: INTERNAL MEDICINE

## 2021-12-14 RX ORDER — PREDNISONE 5 MG/1
10 TABLET ORAL
Status: DISCONTINUED | OUTPATIENT
Start: 2021-12-17 | End: 2021-12-14 | Stop reason: HOSPADM

## 2021-12-14 RX ORDER — PREDNISONE 5 MG/1
15 TABLET ORAL
Qty: 9 TABLET | Refills: 0 | INPATIENT
Start: 2021-12-14 | End: 2021-12-17

## 2021-12-14 RX ORDER — POTASSIUM CHLORIDE 14.9 MG/ML
20 INJECTION INTRAVENOUS ONCE
Status: DISCONTINUED | OUTPATIENT
Start: 2021-12-14 | End: 2021-12-14

## 2021-12-14 RX ORDER — INSULIN GLARGINE 100 [IU]/ML
6 INJECTION, SOLUTION SUBCUTANEOUS NIGHTLY
Status: DISCONTINUED | OUTPATIENT
Start: 2021-12-14 | End: 2021-12-14 | Stop reason: HOSPADM

## 2021-12-14 RX ORDER — PANTOPRAZOLE SODIUM 40 MG/1
40 TABLET, DELAYED RELEASE ORAL
Status: DISCONTINUED | OUTPATIENT
Start: 2021-12-15 | End: 2021-12-14 | Stop reason: HOSPADM

## 2021-12-14 RX ORDER — CEFAZOLIN SODIUM/WATER 2 G/20 ML
2000 SYRINGE (ML) INTRAVENOUS DAILY
Qty: 200 ML | Refills: 0 | Status: SHIPPED | OUTPATIENT
Start: 2021-12-14 | End: 2021-12-24

## 2021-12-14 RX ORDER — LANOLIN ALCOHOL/MO/W.PET/CERES
400 CREAM (GRAM) TOPICAL ONCE
Status: COMPLETED | OUTPATIENT
Start: 2021-12-14 | End: 2021-12-14

## 2021-12-14 RX ORDER — POTASSIUM CHLORIDE 20 MEQ/1
40 TABLET, EXTENDED RELEASE ORAL ONCE
Status: COMPLETED | OUTPATIENT
Start: 2021-12-14 | End: 2021-12-14

## 2021-12-14 RX ORDER — FUROSEMIDE 20 MG/1
20 TABLET ORAL DAILY
Status: DISCONTINUED | OUTPATIENT
Start: 2021-12-15 | End: 2021-12-14 | Stop reason: HOSPADM

## 2021-12-14 RX ORDER — PREDNISONE 10 MG/1
10 TABLET ORAL
Refills: 0 | INPATIENT
Start: 2021-12-17

## 2021-12-14 RX ADMIN — FLUOXETINE 20 MG: 20 CAPSULE ORAL at 09:23

## 2021-12-14 RX ADMIN — CARVEDILOL 12.5 MG: 12.5 TABLET, FILM COATED ORAL at 17:40

## 2021-12-14 RX ADMIN — Medication 1000 MG: at 09:22

## 2021-12-14 RX ADMIN — PANTOPRAZOLE SODIUM 40 MG: 40 TABLET, DELAYED RELEASE ORAL at 07:02

## 2021-12-14 RX ADMIN — SODIUM CHLORIDE, PRESERVATIVE FREE 2 ML: 5 INJECTION INTRAVENOUS at 01:02

## 2021-12-14 RX ADMIN — CARVEDILOL 12.5 MG: 12.5 TABLET, FILM COATED ORAL at 09:23

## 2021-12-14 RX ADMIN — POTASSIUM CHLORIDE 40 MEQ: 1500 TABLET, EXTENDED RELEASE ORAL at 09:23

## 2021-12-14 RX ADMIN — INSULIN LISPRO 4 UNITS: 100 INJECTION, SOLUTION INTRAVENOUS; SUBCUTANEOUS at 09:24

## 2021-12-14 RX ADMIN — SODIUM CHLORIDE, PRESERVATIVE FREE 2 ML: 5 INJECTION INTRAVENOUS at 09:32

## 2021-12-14 RX ADMIN — PREDNISONE 15 MG: 5 TABLET ORAL at 09:23

## 2021-12-14 RX ADMIN — Medication 400 MG: at 17:40

## 2021-12-14 RX ADMIN — ISOSORBIDE MONONITRATE 60 MG: 30 TABLET, EXTENDED RELEASE ORAL at 09:23

## 2021-12-14 RX ADMIN — Medication 400 MG: at 11:03

## 2021-12-14 RX ADMIN — DEXTROSE, SODIUM CHLORIDE, SODIUM LACTATE, POTASSIUM CHLORIDE, AND CALCIUM CHLORIDE: 5; .6; .31; .03; .02 INJECTION, SOLUTION INTRAVENOUS at 01:33

## 2021-12-14 RX ADMIN — CHOLESTYRAMINE 4 G: 4 POWDER, FOR SUSPENSION ORAL at 11:02

## 2021-12-14 RX ADMIN — AZATHIOPRINE 75 MG: 50 TABLET ORAL at 11:03

## 2021-12-14 ASSESSMENT — COLUMBIA-SUICIDE SEVERITY RATING SCALE - C-SSRS: IS THE PATIENT ABLE TO COMPLETE C-SSRS: NO, DEFER TO LATER TIME

## 2021-12-14 ASSESSMENT — PAIN SCALES - GENERAL
PAINLEVEL_OUTOF10: 0
PAINLEVEL_OUTOF10: 0

## 2021-12-14 ASSESSMENT — COGNITIVE AND FUNCTIONAL STATUS - GENERAL
BECAUSE OF A PHYSICAL, MENTAL, OR EMOTIONAL CONDITION, DO YOU HAVE DIFFICULTY DOING ERRANDS ALONE: YES
DO YOU HAVE SERIOUS DIFFICULTY WALKING OR CLIMBING STAIRS: YES
BECAUSE OF A PHYSICAL, MENTAL, OR EMOTIONAL CONDITION, DO YOU HAVE SERIOUS DIFFICULTY CONCENTRATING, REMEMBERING OR MAKING DECISIONS: NO
DO YOU HAVE DIFFICULTY DRESSING OR BATHING: YES

## 2021-12-14 ASSESSMENT — PATIENT HEALTH QUESTIONNAIRE - PHQ9: IS PATIENT ABLE TO COMPLETE PHQ2 OR PHQ9: NO, DEFER TO LATER TIME

## 2021-12-15 LAB — BACTERIA UR CULT: ABNORMAL

## 2021-12-16 ENCOUNTER — TELEPHONE (OUTPATIENT)
Dept: POST ACUTE CARE | Age: 86
End: 2021-12-16

## (undated) DEVICE — SYRINGE 60ML SLIP TIP

## (undated) DEVICE — LOCKING DEVICE RX & BIOPSY CAP

## (undated) DEVICE — ENDOSCOPY PACK UPPER: Brand: MEDLINE INDUSTRIES, INC.

## (undated) DEVICE — MEDI-VAC SUCTION HANDLE REGULAR CAPACITY: Brand: CARDINAL HEALTH

## (undated) DEVICE — Device

## (undated) DEVICE — MEDI-VAC NON-CONDUCTIVE SUCTION TUBING: Brand: CARDINAL HEALTH

## (undated) DEVICE — 3M™ RED DOT™ MONITORING ELECTRODE WITH FOAM TAPE AND STICKY GEL, 50/BAG, 20/CASE, 72/PLT 2570: Brand: RED DOT™

## (undated) DEVICE — FILTERLINE NASAL ADULT O2/CO2

## (undated) DEVICE — 1200CC GUARDIAN II: Brand: GUARDIAN

## (undated) NOTE — IP AVS SNAPSHOT
[address]            (For Outpatient Use Only) Initial Admit Date: 7/25/2021   Inpt/Obs Admit Date: Inpt: 7/26/21 / Obs: N/A   Discharge Date:    Merary Lusty:  [de-identified]   MRN: [de-identified]   CSN: [id number]   CEID: [id number] Subscriber ID:  Pt Rel to Subscriber:    Hospital Account Financial Class: Medicare Advantage    August 4, 2021

## (undated) NOTE — LETTER
21          Dean Champion  :  1933      To Whom It May Concern: This patient was seen in our office on 21 . Work status:  {VISHNU RETURN TO ISLJ:7290}    May return to work status per above effective ***.     If this office may be

## (undated) NOTE — LETTER
[address]  [address] [facility] [address], [address] [facility]  Authorization for Surgical Operation and Procedure     Date:___________                                                                                                         Time:__________ transfusion and/or blood products.   The following are some, but not all, of the potential risks that can occur: fever and allergic reactions, hemolytic reactions, transmission of diseases such as Hepatitis, AIDS and Cytomegalovirus (CMV) and fluid overload person authorized to consent on my behalf). The surgeon or my attending physician will determine when the applicable recovery period ends for purposes of reinstating the DNAR order.   10. Patients having a sterilization procedure: I understand that if the p patient asking for anesthesia services, I agree to:  a. Allow the anesthesiologist (anesthesia doctor) to give me medicine and do additional procedures as necessary.  Some examples are: Starting or using an “IV” to give me medicine, fluids or blood during m rhythms and nerve injury. 7. Regional Anesthesia (“spinal”, “epidural”, & “nerve blocks”): I understand that rare but potential complications include headache, bleeding, infection, seizure, irregular heart rhythms, and nerve injury.     I can change my mi